# Patient Record
Sex: FEMALE | Race: BLACK OR AFRICAN AMERICAN | Employment: FULL TIME | URBAN - METROPOLITAN AREA
[De-identification: names, ages, dates, MRNs, and addresses within clinical notes are randomized per-mention and may not be internally consistent; named-entity substitution may affect disease eponyms.]

---

## 2019-10-23 ENCOUNTER — HOSPITAL ENCOUNTER (OUTPATIENT)
Dept: MAMMOGRAPHY | Age: 39
Discharge: HOME | End: 2019-10-23
Attending: OBSTETRICS & GYNECOLOGY
Payer: COMMERCIAL

## 2019-10-23 ENCOUNTER — HOSPITAL ENCOUNTER (OUTPATIENT)
Dept: LAB | Age: 39
Discharge: HOME | End: 2019-10-23
Payer: COMMERCIAL

## 2019-10-23 DIAGNOSIS — Z12.31 VISIT FOR SCREENING MAMMOGRAM: ICD-10-CM

## 2019-10-23 DIAGNOSIS — Z12.39 ENCOUNTER FOR OTHER SCREENING FOR MALIGNANT NEOPLASM OF BREAST: ICD-10-CM

## 2019-10-23 PROCEDURE — 88175 CYTOPATH C/V AUTO FLUID REDO: CPT

## 2019-10-23 PROCEDURE — 77063 BREAST TOMOSYNTHESIS BI: CPT

## 2019-10-23 PROCEDURE — 87624 HPV HI-RISK TYP POOLED RSLT: CPT

## 2019-10-28 ENCOUNTER — HOSPITAL ENCOUNTER (OUTPATIENT)
Dept: MAMMOGRAPHY | Age: 39
Discharge: HOME | End: 2019-10-28
Attending: OBSTETRICS & GYNECOLOGY
Payer: COMMERCIAL

## 2019-10-28 ENCOUNTER — HOSPITAL ENCOUNTER (OUTPATIENT)
Dept: ULTRASOUND IMAGING | Age: 39
Discharge: HOME | End: 2019-10-28
Attending: OBSTETRICS & GYNECOLOGY
Payer: COMMERCIAL

## 2019-10-28 DIAGNOSIS — R92.8 ABNORMALITY OF RIGHT BREAST ON SCREENING MAMMOGRAM: ICD-10-CM

## 2019-10-28 DIAGNOSIS — R92.2 INCONCLUSIVE MAMMOGRAM: ICD-10-CM

## 2019-10-28 PROCEDURE — 77061 BREAST TOMOSYNTHESIS UNI: CPT

## 2019-10-28 PROCEDURE — 76642 ULTRASOUND BREAST LIMITED: CPT

## 2020-02-10 ENCOUNTER — HOSPITAL ENCOUNTER (OUTPATIENT)
Dept: LAB | Age: 40
Discharge: HOME | End: 2020-02-10
Payer: COMMERCIAL

## 2020-02-10 PROCEDURE — 88305 TISSUE EXAM BY PATHOLOGIST: CPT

## 2020-11-05 ENCOUNTER — HOSPITAL ENCOUNTER (OUTPATIENT)
Dept: MAMMOGRAPHY | Age: 40
Discharge: HOME | End: 2020-11-05
Attending: OBSTETRICS & GYNECOLOGY
Payer: COMMERCIAL

## 2020-11-05 DIAGNOSIS — Z12.31 VISIT FOR SCREENING MAMMOGRAM: ICD-10-CM

## 2020-11-05 PROCEDURE — 77063 BREAST TOMOSYNTHESIS BI: CPT

## 2021-04-22 ENCOUNTER — HOSPITAL ENCOUNTER (OUTPATIENT)
Dept: LAB | Age: 41
Discharge: HOME | End: 2021-04-22
Payer: COMMERCIAL

## 2021-04-22 PROCEDURE — 88175 CYTOPATH C/V AUTO FLUID REDO: CPT

## 2021-04-26 LAB
CYTOLOGIST CVX/VAG CYTO: NORMAL
CYTOLOGY CVX/VAG DOC THIN PREP: NORMAL
HPV REFLEX NOTE, HPVL19: NORMAL
Lab: NORMAL
PATH REPORT.FINAL DX SPEC: NORMAL
STAT OF ADQ CVX/VAG CYTO-IMP: NORMAL

## 2022-02-01 ENCOUNTER — TRANSCRIBE ORDER (OUTPATIENT)
Dept: SCHEDULING | Age: 42
End: 2022-02-01

## 2022-02-01 DIAGNOSIS — Z12.31 VISIT FOR SCREENING MAMMOGRAM: Primary | ICD-10-CM

## 2022-02-02 ENCOUNTER — HOSPITAL ENCOUNTER (OUTPATIENT)
Dept: WOMENS IMAGING | Age: 42
Discharge: HOME | End: 2022-02-02
Attending: OBSTETRICS & GYNECOLOGY
Payer: COMMERCIAL

## 2022-02-02 DIAGNOSIS — Z12.31 VISIT FOR SCREENING MAMMOGRAM: ICD-10-CM

## 2022-02-02 PROCEDURE — 77063 BREAST TOMOSYNTHESIS BI: CPT

## 2023-06-01 ENCOUNTER — HOSPITAL ENCOUNTER (OUTPATIENT)
Dept: WOMENS IMAGING | Facility: HOSPITAL | Age: 43
Discharge: HOME | End: 2023-06-01
Attending: OBSTETRICS & GYNECOLOGY
Payer: MEDICAID

## 2023-06-01 DIAGNOSIS — Z12.31 VISIT FOR SCREENING MAMMOGRAM: ICD-10-CM

## 2023-06-01 PROCEDURE — 77063 BREAST TOMOSYNTHESIS BI: CPT

## 2023-06-07 ENCOUNTER — HOSPITAL ENCOUNTER (OUTPATIENT)
Facility: HOSPITAL | Age: 43
Discharge: HOME | End: 2023-06-10
Attending: OBSTETRICS & GYNECOLOGY
Payer: MEDICAID

## 2023-06-07 ENCOUNTER — HOSPITAL ENCOUNTER (OUTPATIENT)
Dept: WOMENS IMAGING | Facility: HOSPITAL | Age: 43
Discharge: HOME | End: 2023-06-10
Attending: OBSTETRICS & GYNECOLOGY
Payer: MEDICAID

## 2023-06-07 DIAGNOSIS — R92.8 ABNORMAL FINDING ON BREAST IMAGING: ICD-10-CM

## 2023-06-07 PROCEDURE — G0279 TOMOSYNTHESIS, MAMMO: HCPCS

## 2023-06-07 PROCEDURE — 76642 ULTRASOUND BREAST LIMITED: CPT

## 2023-06-26 ENCOUNTER — HOSPITAL ENCOUNTER (OUTPATIENT)
Facility: HOSPITAL | Age: 43
Setting detail: SPECIMEN
Discharge: HOME | End: 2023-06-29
Payer: MEDICAID

## 2023-06-26 PROCEDURE — 87624 HPV HI-RISK TYP POOLED RSLT: CPT

## 2023-06-26 PROCEDURE — 88175 CYTOPATH C/V AUTO FLUID REDO: CPT

## 2023-10-19 ENCOUNTER — OFFICE VISIT (OUTPATIENT)
Age: 43
End: 2023-10-19
Payer: MEDICAID

## 2023-10-19 VITALS — OXYGEN SATURATION: 98 % | WEIGHT: 250 LBS | HEART RATE: 72 BPM | BODY MASS INDEX: 35 KG/M2 | HEIGHT: 71 IN

## 2023-10-19 DIAGNOSIS — G89.29 CHRONIC BILATERAL LOW BACK PAIN WITH LEFT-SIDED SCIATICA: Primary | ICD-10-CM

## 2023-10-19 DIAGNOSIS — M54.42 CHRONIC BILATERAL LOW BACK PAIN WITH LEFT-SIDED SCIATICA: Primary | ICD-10-CM

## 2023-10-19 PROCEDURE — 99203 OFFICE O/P NEW LOW 30 MIN: CPT | Performed by: PHYSICAL MEDICINE & REHABILITATION

## 2023-10-19 RX ORDER — LISINOPRIL AND HYDROCHLOROTHIAZIDE 12.5; 1 MG/1; MG/1
1 TABLET ORAL DAILY
COMMUNITY
Start: 2023-08-01

## 2023-10-19 RX ORDER — LIDOCAINE HYDROCHLORIDE 20 MG/ML
SOLUTION OROPHARYNGEAL
COMMUNITY
Start: 2023-10-04

## 2023-10-19 RX ORDER — EMPAGLIFLOZIN 25 MG/1
25 TABLET, FILM COATED ORAL DAILY
COMMUNITY
Start: 2023-09-02

## 2023-10-19 RX ORDER — ATORVASTATIN CALCIUM 10 MG/1
10 TABLET, FILM COATED ORAL DAILY
COMMUNITY
Start: 2023-08-01

## 2023-10-19 RX ORDER — FLUTICASONE PROPIONATE 50 MCG
SPRAY, SUSPENSION (ML) NASAL
COMMUNITY
Start: 2023-10-13

## 2023-10-19 RX ORDER — FERROUS SULFATE 325(65) MG
1 TABLET ORAL 2 TIMES DAILY
COMMUNITY
Start: 2023-09-09

## 2023-10-19 RX ORDER — CHOLECALCIFEROL (VITAMIN D3) 1250 MCG
1 CAPSULE ORAL WEEKLY
COMMUNITY
Start: 2023-10-02

## 2023-10-19 ASSESSMENT — PATIENT HEALTH QUESTIONNAIRE - PHQ9
2. FEELING DOWN, DEPRESSED OR HOPELESS: 0
SUM OF ALL RESPONSES TO PHQ9 QUESTIONS 1 & 2: 0
SUM OF ALL RESPONSES TO PHQ QUESTIONS 1-9: 0
1. LITTLE INTEREST OR PLEASURE IN DOING THINGS: 0
SUM OF ALL RESPONSES TO PHQ QUESTIONS 1-9: 0

## 2023-10-19 NOTE — PROGRESS NOTES
Keon Díaz presents today for   Chief Complaint   Patient presents with    Back Pain       Is someone accompanying this pt? no    Is the patient using any DME equipment during OV? no    Depression Screening:       No data to display                Learning Assessment:  No flowsheet data found. Abuse Screening:       No data to display                Fall Risk  No flowsheet data found. OPIOID RISK TOOL  No flowsheet data found. Coordination of Care:  1. Have you been to the ER, urgent care clinic since your last visit? Yes pink eye 10/23  Hospitalized since your last visit? no    2. Have you seen or consulted any other health care providers outside of the [address] [place] [address] since your last visit? no Include any pap smears or colon screening.  no

## 2023-10-19 NOTE — PROGRESS NOTES
[facility]  [address], [address] [place] [address]   Phone: [phone]  Fax: [phone]      Patient: Jennifer Negron                                                                              MRN: [id number]        YOB: 1980          AGE: 37 y.o. PCP: Bisi Madrid DO  Date:  10/19/23    Reason for Consultation: Back Pain      HPI:  Jennifer Negron is a 37 y.o. female with relevant PMH of DM, HTN  who presents with low back pain. The pain began over 10 years ago  Denies any precipitating incident or trauma. Neurologic symptoms: No numbness, tingling, weakness, bowel or bladder changes. No recent falls      Location: The pain is located in the low back pain   Radiation: The pain does radiate down the left leg to posterior thigh. Pain Score: Currently: 8/10    Quality: Pain is of a aching, cramping, stabbing, stiff quality. Aggravating: Pain is exacerbated by walking and sitting  Alleviating: The pain is alleviated by lying down    Prior Treatments:  Physical therapy: None  Injections:Yes ADAN- >10 years ago  Surgery:No  Previous Medications:   Current Medications: ibuprofen 800mg  Previous work-up has included:   Images: The imaging results as well as the actual images of the studies below were reviewed, visualized and interpreted by me. X-ray lumbar spine 7/19/2023  VERTEBRAE AND ALIGNMENT: Stable vertebral body heights. No listhesis. Mild multilevel degenerative facet osteoarthrosis with progressive hypertrophic changes at L4-5 and L5-S1.     DISC SPACES: Multilevel degenerative endplate spurring/spondylosis, intervally progressed at posterior L4-5 and L5-S1, with mild L4-5 and mild-to-moderate L5-S1 disc space narrowing   Past Medical History:   Past Medical History:   Diagnosis Date    DM (diabetes mellitus) ([address])     HTN (hypertension)       Past Surgical History: History reviewed.  No pertinent surgical

## 2023-10-25 ENCOUNTER — HOSPITAL ENCOUNTER (OUTPATIENT)
Facility: HOSPITAL | Age: 43
Setting detail: RECURRING SERIES
Discharge: HOME | End: 2023-10-28
Payer: MEDICAID

## 2023-10-25 PROCEDURE — 97162 PT EVAL MOD COMPLEX 30 MIN: CPT

## 2023-10-25 NOTE — PROGRESS NOTES
PHYSICAL / OCCUPATIONAL THERAPY - DAILY TREATMENT NOTE (updated )    Patient Name: Ernst Yeboah    Date: 10/25/2023    : 1980  Insurance: Payor: Boby Minaya / Plan: Boby Minaya / Product Type: *No Product type* /      Patient  verified Yes     Visit #   Current / Total 1 10   Time   In / Out 1:00 1:38   Pain   In / Out 8 6   Subjective Functional Status/Changes: Pain:    Worst: 10/10  Best: 610  Location: left lumbar and post hip and LE  Aggravated by: car transfers, sleep, prolonged sitting, sit to stand transfer, walking and standing prolonged >10 minutes, stairs  Alleviated by: side lying on left side with knee pillow, supine     TREATMENT AREA =  Other low back pain [M54.59]    OBJECTIVE    Modalities Rationale:     decrease pain to improve patient's ability to progress to PLOF and address remaining functional goals. min [] Estim Unattended, type/location:                                      []  w/ice    []  w/heat    min [] Estim Attended, type/location:                                     []  w/US     []  w/ice    []  w/heat    []  TENS insruct      min []  Mechanical Traction: type/lbs                   []  pro   []  sup   []  int   []  cont    []  before manual    []  after manual    min []  Ultrasound, settings/location:      min []  Iontophoresis w/ dexamethasone, location:                                               []  take home patch       []  in clinic   10 min  unbill []  Ice     [x]  Heat    location/position: Lumbar in prone    min []  Paraffin,  details:     min []  Vasopneumatic Device, press/temp:     min []  Charlann Dues / Exie Rahman:     If using vaso (only need to measure limb vaso being performed on)      pre-treatment girth :       post-treatment girth :       measured at (landmark location) :      min []  Other:    Skin assessment post-treatment (if applicable):    [x]  intact    []  redness- no adverse reaction                 []redness - adverse reaction:
Complexity Rating: MEDIUM  Problem List: pain affecting function, decrease ROM, decrease strength, impaired gait/balance, decrease ADL/functional abilities, decrease activity tolerance, and decrease flexibility/joint mobility   Treatment Plan may include any combination of the followin Therapeutic Exercise, 63935 Neuromuscular Re-Education, 46850 Manual Therapy, 66410 Therapeutic Activity, 56364 Self Care/Home Management, 82187 Electrical Stim unattended, and 63651 Vasopneumatic Device  (Vasopnuematic compression justification:  Per bilateral girth measures taken and listed above the edema is considered significant and having an impact on the patient's self care and ADL's) If patient is receiving VASO: Vasopnuematic compression justification:  Per bilateral girth measures taken and listed above the edema is considered significant and having an impact on the patient's strength, balance, gait, transfers, self care, and ADL's  Patient / Family readiness to learn indicated by: asking questions, trying to perform skills, interest, return verbalization , and return demonstration   Persons(s) to be included in education: patient (P)  Barriers to Learning/Limitations: none  Measures taken if barriers to learning present: NA  Patient Goal (s): \"Relief\"  Patient Self Reported Health Status: good  Rehabilitation Potential: good    Short Term Goals: To be accomplished in 4 weeks  Patient will report daily compliance with HEP to improve tolerance to ADLs. Status at last assessment: provided HEP at   Long Term Goals: To be accomplished in 10 treatments  Patient will report pain 2/10 at worst to improve tolerance to sitting, standing and walking. Status at last assessment: pain 10/10 at worst  Patient will increase TUG Score to 10 seconds to improve tolerance to ADLs. Status at last assessment: 15 seconds  Patient will increase 5 X STS to 20 seconds to improve sit to stand transfers.   Status at last assessment: 30

## 2023-10-27 ENCOUNTER — APPOINTMENT (OUTPATIENT)
Facility: HOSPITAL | Age: 43
End: 2023-10-27
Payer: MEDICAID

## 2023-11-01 ENCOUNTER — HOSPITAL ENCOUNTER (OUTPATIENT)
Facility: HOSPITAL | Age: 43
Setting detail: RECURRING SERIES
Discharge: HOME | End: 2023-11-04
Payer: MEDICAID

## 2023-11-01 PROCEDURE — 97112 NEUROMUSCULAR REEDUCATION: CPT

## 2023-11-01 PROCEDURE — 97530 THERAPEUTIC ACTIVITIES: CPT

## 2023-11-01 PROCEDURE — 97110 THERAPEUTIC EXERCISES: CPT

## 2023-11-01 PROCEDURE — G0283 ELEC STIM OTHER THAN WOUND: HCPCS

## 2023-11-01 NOTE — PROGRESS NOTES
PHYSICAL / OCCUPATIONAL THERAPY - DAILY TREATMENT NOTE (updated )    Patient Name: Mary Jya    Date: 2023    : 1980  Insurance: Payor: Everton Godoy / Plan: Everton Godoy / Product Type: *No Product type* /      Patient  verified Yes     Visit #   Current / Total 2 10   Time   In / Out 11:40 12:28   Pain   In / Out 6 4   Subjective Functional Status/Changes: \"My back and leg still feel the same. \"     TREATMENT AREA =  Other low back pain [M54.59]    OBJECTIVE    Modalities Rationale:     decrease pain to improve patient's ability to progress to PLOF and address remaining functional goals. 10 min [x] Estim Unattended, type/location: Lumbar IFC in prone with one pillow                                     []  w/ice    [x]  w/heat    min [] Estim Attended, type/location:                                     []  w/US     []  w/ice    []  w/heat    []  TENS insruct      min []  Mechanical Traction: type/lbs                   []  pro   []  sup   []  int   []  cont    []  before manual    []  after manual    min []  Ultrasound, settings/location:      min []  Iontophoresis w/ dexamethasone, location:                                               []  take home patch       []  in clinic   X min  unbill []  Ice     [x]  Heat    location/position: Lumbar in prone    min []  Paraffin,  details:     min []  Vasopneumatic Device, press/temp:     min []  Alpae Norma / Sana Pardon: If using vaso (only need to measure limb vaso being performed on)      pre-treatment girth :       post-treatment girth :       measured at (landmark location) :      min []  Other:    Skin assessment post-treatment (if applicable):    [x]  intact    []  redness- no adverse reaction                 []redness - adverse reaction:        Therapeutic Procedures:   Tx Min  Procedure, Rationale, Specifics   20  35144 Therapeutic Exercise (timed):  increase ROM, strength, coordination, balance, and proprioception to improve patient's

## 2023-11-03 ENCOUNTER — HOSPITAL ENCOUNTER (OUTPATIENT)
Facility: HOSPITAL | Age: 43
Setting detail: RECURRING SERIES
Discharge: HOME | End: 2023-11-06
Payer: MEDICAID

## 2023-11-03 PROCEDURE — 97535 SELF CARE MNGMENT TRAINING: CPT

## 2023-11-03 PROCEDURE — 97110 THERAPEUTIC EXERCISES: CPT

## 2023-11-03 PROCEDURE — G0283 ELEC STIM OTHER THAN WOUND: HCPCS

## 2023-11-03 PROCEDURE — 97112 NEUROMUSCULAR REEDUCATION: CPT

## 2023-11-03 NOTE — PROGRESS NOTES
PHYSICAL / OCCUPATIONAL THERAPY - DAILY TREATMENT NOTE (updated )    Patient Name: Flaco Cooper    Date: 11/3/2023    : 1980  Insurance: Payor: Kevin Hardin / Plan: Tomas Queen / Product Type: *No Product type* /      Patient  verified Yes     Visit #   Current / Total 3 10   Time   In / Out 1:00 pm 1:53 pm   Pain   In / Out 4-5/10 010   Subjective Functional Status/Changes: \"I still have the pain. I do the exercises every now and then. \"     TREATMENT AREA =  Other low back pain [M54.59]    OBJECTIVE    Modalities Rationale:     decrease pain to improve patient's ability to progress to PLOF and address remaining functional goals. 10 min [x] Estim Unattended, type/location: Lumbar IFC in prone with two pillows under stomach to promote neutral spinal alignment                                     []  w/ice    [x]  w/heat   Skin assessment post-treatment (if applicable):    [x]  intact    []  redness- no adverse reaction                 []redness - adverse reaction:        Therapeutic Procedures: Tx Min  Procedure, Rationale, Specifics   15  61315 Therapeutic Exercise (timed):  increase ROM, strength, coordination, balance, and proprioception to improve patient's ability to progress to PLOF and address remaining functional goals. (see flow sheet as applicable)      Details if applicable:  lumbar extension protocol     18  00735 Neuromuscular Re-Education (timed):  improve balance, coordination, kinesthetic sense, posture, core stability and proprioception to improve patient's ability to develop conscious control of individual muscles and awareness of position of extremities in order to progress to PLOF and address remaining functional goals.  (see flow sheet as applicable)       Details if applicable:  core and glute re-ed, primarily in prone position     10  28932 Self Care/Home Management (timed):  improve patient knowledge and understanding of pain reducing techniques,

## 2023-11-06 ENCOUNTER — APPOINTMENT (OUTPATIENT)
Facility: HOSPITAL | Age: 43
End: 2023-11-06
Payer: MEDICAID

## 2023-11-10 ENCOUNTER — HOSPITAL ENCOUNTER (OUTPATIENT)
Facility: HOSPITAL | Age: 43
Setting detail: RECURRING SERIES
Discharge: HOME | End: 2023-11-13
Payer: MEDICAID

## 2023-11-10 PROCEDURE — G0283 ELEC STIM OTHER THAN WOUND: HCPCS

## 2023-11-10 PROCEDURE — 97112 NEUROMUSCULAR REEDUCATION: CPT

## 2023-11-10 PROCEDURE — 97535 SELF CARE MNGMENT TRAINING: CPT

## 2023-11-10 PROCEDURE — 97110 THERAPEUTIC EXERCISES: CPT

## 2023-11-10 NOTE — PROGRESS NOTES
PHYSICAL / OCCUPATIONAL THERAPY - DAILY TREATMENT NOTE (updated )    Patient Name: Kyleigh Guzman    Date: 11/10/2023    : 1980  Insurance: Payor: Magdaleno Colorado / Plan: Magdaleno Colorado / Product Type: *No Product type* /      Patient  verified Yes     Visit #   Current / Total 4 10   Time   In / Out 10:57 am 11:38 am   Pain   In / Out 7/10 in left flank and hip 4/10   Subjective Functional Status/Changes: \"It's been worse the past couple days. \"     TREATMENT AREA =  Other low back pain [M54.59]    OBJECTIVE    Modalities Rationale:     decrease pain to improve patient's ability to progress to PLOF and address remaining functional goals. 10 min [x] Estim Unattended, type/location: Lumbar IFC in prone with two pillows under stomach to promote neutral spinal alignment                                     []  w/ice    [x]  w/heat   Skin assessment post-treatment (if applicable):    [x]  intact    []  redness- no adverse reaction                 []redness - adverse reaction:        Therapeutic Procedures: Tx Min  Procedure, Rationale, Specifics   13  08504 Therapeutic Exercise (timed):  increase ROM, strength, coordination, balance, and proprioception to improve patient's ability to progress to PLOF and address remaining functional goals. (see flow sheet as applicable)      Details if applicable:  lumbar extension protocol     8  45055 Neuromuscular Re-Education (timed):  improve balance, coordination, kinesthetic sense, posture, core stability and proprioception to improve patient's ability to develop conscious control of individual muscles and awareness of position of extremities in order to progress to PLOF and address remaining functional goals.  (see flow sheet as applicable)       Details if applicable:  core and glute re-ed, primarily in prone position     10  26035 Self Care/Home Management (timed):  improve patient knowledge and understanding of pain reducing techniques, positioning,

## 2023-11-13 ENCOUNTER — HOSPITAL ENCOUNTER (OUTPATIENT)
Facility: HOSPITAL | Age: 43
Setting detail: RECURRING SERIES
Discharge: HOME | End: 2023-11-16
Payer: MEDICAID

## 2023-11-13 PROCEDURE — 97110 THERAPEUTIC EXERCISES: CPT

## 2023-11-13 PROCEDURE — 97112 NEUROMUSCULAR REEDUCATION: CPT

## 2023-11-13 PROCEDURE — G0283 ELEC STIM OTHER THAN WOUND: HCPCS

## 2023-11-13 PROCEDURE — 97535 SELF CARE MNGMENT TRAINING: CPT

## 2023-11-13 NOTE — PROGRESS NOTES
PHYSICAL / OCCUPATIONAL THERAPY - DAILY TREATMENT NOTE (updated )    Patient Name: Julissa Nguyen    Date: 2023    : 1980  Insurance: Payor: Stefani Pierce / Plan: Stefani Pierce / Product Type: *No Product type* /      Patient  verified Yes     Visit #   Current / Total 5 10   Time   In / Out 11:40 12:32   Pain   In / Out 5/10 2/10   Subjective Functional Status/Changes: \"I'm not feeling any better. \"     TREATMENT AREA =  Other low back pain [M54.59]    OBJECTIVE    Modalities Rationale:     decrease pain to improve patient's ability to progress to PLOF and address remaining functional goals. 10 min [x] Estim Unattended, type/location: Lumbar IFC in supine with knee wedge                                    []  w/ice    [x]  w/heat   Skin assessment post-treatment (if applicable):    [x]  intact    []  redness- no adverse reaction                 []redness - adverse reaction:        Therapeutic Procedures: Tx Min  Procedure, Rationale, Specifics   16  81940 Therapeutic Exercise (timed):  increase ROM, strength, coordination, balance, and proprioception to improve patient's ability to progress to PLOF and address remaining functional goals. (see flow sheet as applicable)      Details if applicable:  neutral spine strengthening and core stability, hip mobility     16  06376 Neuromuscular Re-Education (timed):  improve balance, coordination, kinesthetic sense, posture, core stability and proprioception to improve patient's ability to develop conscious control of individual muscles and awareness of position of extremities in order to progress to PLOF and address remaining functional goals.  (see flow sheet as applicable)       Details if applicable:  core and glute re-ed; prone DTM left post hip and sacral extension mobs to improve glute firing     10  37028 Self Care/Home Management (timed):  improve patient knowledge and understanding of pain reducing techniques, positioning,

## 2023-11-15 ENCOUNTER — APPOINTMENT (OUTPATIENT)
Facility: HOSPITAL | Age: 43
End: 2023-11-15
Payer: MEDICAID

## 2023-11-20 ENCOUNTER — HOSPITAL ENCOUNTER (OUTPATIENT)
Facility: HOSPITAL | Age: 43
Setting detail: RECURRING SERIES
Discharge: HOME | End: 2023-11-23
Payer: MEDICAID

## 2023-11-20 PROCEDURE — 97112 NEUROMUSCULAR REEDUCATION: CPT

## 2023-11-20 PROCEDURE — 97535 SELF CARE MNGMENT TRAINING: CPT

## 2023-11-20 PROCEDURE — 97110 THERAPEUTIC EXERCISES: CPT

## 2023-11-20 NOTE — PROGRESS NOTES
PHYSICAL / OCCUPATIONAL THERAPY - DAILY TREATMENT NOTE (updated )    Patient Name: Aby Riddle    Date: 2023    : 1980  Insurance: Payor: Janice Herrmann / Plan: Janice Herrmann / Product Type: *No Product type* /      Patient  verified Yes     Visit #   Current / Total 6 10   Time   In / Out 11:05 am 11:45 am   Pain   In / Out 5/10 5/10   Subjective Functional Status/Changes: \"I feel terrible. I just want to get a scan on it to see, so they know what it going on with it. \"     TREATMENT AREA =  Other low back pain [M54.59]    OBJECTIVE  Modalities Rationale:     decrease pain to improve patient's ability to progress to PLOF and address remaining functional goals. min [] Estim Unattended, type/location:                                      []  w/ice    []  w/heat    min [] Estim Attended, type/location:                                     []  w/US     []  w/ice    []  w/heat    []  TENS insruct      min []  Mechanical Traction: type/lbs                   []  pro   []  sup   []  int   []  cont    []  before manual    []  after manual    min []  Ultrasound, settings/location:     10 min  unbill [x]  Ice     []  Heat    location/position: L/S and (L) SIJ, prone    min []  Paraffin,  details:     min []  Vasopneumatic Device, press/temp:     min []  Jandayrona Antwon / Petoskey Lasso: If using vaso (only need to measure limb vaso being performed on)      pre-treatment girth :       post-treatment girth :       measured at (landmark location) :      min []  Other:    Skin assessment post-treatment:   Intact       Therapeutic Procedures: Tx Min  Procedure, Rationale, Specifics   10  90104 Therapeutic Exercise (timed):  increase ROM, strength, coordination, balance, and proprioception to improve patient's ability to progress to PLOF and address remaining functional goals.  (see flow sheet as applicable)      Details if applicable:  neutral spine strengthening and core stability, hip mobility     12  24497

## 2023-11-22 ENCOUNTER — APPOINTMENT (OUTPATIENT)
Facility: HOSPITAL | Age: 43
End: 2023-11-22
Payer: MEDICAID

## 2023-11-27 ENCOUNTER — APPOINTMENT (OUTPATIENT)
Facility: HOSPITAL | Age: 43
End: 2023-11-27
Payer: MEDICAID

## 2023-11-28 ENCOUNTER — HOSPITAL ENCOUNTER (OUTPATIENT)
Facility: HOSPITAL | Age: 43
Setting detail: RECURRING SERIES
Discharge: HOME | End: 2023-12-01
Payer: MEDICAID

## 2023-11-28 PROCEDURE — 97110 THERAPEUTIC EXERCISES: CPT

## 2023-11-28 PROCEDURE — 97535 SELF CARE MNGMENT TRAINING: CPT

## 2023-11-28 PROCEDURE — 97112 NEUROMUSCULAR REEDUCATION: CPT

## 2023-11-28 NOTE — PROGRESS NOTES
[address] [facility] [facility]   [address], [address]  Phone: [phone] Fax: [phone]  PROGRESS NOTE  Patient Name: Mando Moran : 1980   Treatment/Medical Diagnosis: Other low back pain [M54.59]   Referral Source:  Payor Ashu Lomeli*  Payor: RAHAT Arguellory / Plan: Brian Elliot / Product Type: *No Product type* /      Date of Initial Visit: 10/25/2023 Attended Visits: 7 Missed Visits: 1     SUMMARY OF TREATMENT  Mando Moran is a 37 y.o.  yo female with Dx of Other low back pain [M54.59]. Chronic LBP > 5 years with recent exacerbation of symptoms. Treatment has consisted of TE, TA, NMRE, gait training for normalization of gait, modalities including heat and IFC for pain management; postural education, HEP, pt education     CURRENT STATUS  Pt has made poor progress in therapy, con't to rate high levels of pain and nothing that significantly improves her pain. She does have objective improvements in function. Pt did benefit from extension progression and now possibly has a lateral component with relief in L SL rotation position. However, pain is largely unchanged after therapy session ends. She has new onset of L anterior thigh and knee pain, possibly orthopaedic related to chronic antalgic gait; will con't to monitor. Recommend pt con't with PT until her follow-up with MD on 23     Pain ranges: 2 to 10/10   Pain constantly at the L posterior and anterior hip; more often in the anterior knee and thigh  Deficits : pain prevents her from falling asleep; some seizing pain; sitting sustained; long car ride for Thanksgiving.    improvements: none noted; short lived improvements from PT session   0% improvement  Pain increase: \"unsure\"; sitting in the car  Pain made better: ibuprofen, ice  Future care: scheduled for MRI end of December     Lumbar AROM:  Flexion To tib tub; anterior L groin pain (not normal for pt)

## 2023-11-28 NOTE — PROGRESS NOTES
PHYSICAL / OCCUPATIONAL THERAPY - DAILY TREATMENT NOTE (updated )    Patient Name: Ceci Riding    Date: 2023    : 1980  Insurance: Payor: Shashank Navarro / Plan: Shashank Navarro / Product Type: *No Product type* /      Patient  verified Yes     Visit #   Current / Total 7 10   Time   In / Out 9:46 10:35   Pain   In / Out 4-5 0-1   Subjective Functional Status/Changes: \"I spent all day in bed and almost cancelled\"  Pain location: L hip, knee, full lumbar spine     TREATMENT AREA =  Other low back pain [M54.59]    OBJECTIVE  Modalities Rationale:     decrease pain to improve patient's ability to progress to PLOF and address remaining functional goals. min [] Estim Unattended, type/location:                                      []  w/ice    []  w/heat    min [] Estim Attended, type/location:                                     []  w/US     []  w/ice    []  w/heat    []  TENS insruct      min []  Mechanical Traction: type/lbs                   []  pro   []  sup   []  int   []  cont    []  before manual    []  after manual    min []  Ultrasound, settings/location:     10 min  unbill [x]  Ice     []  Heat    location/position: L/S and (L) SIJ, prone    min []  Paraffin,  details:     min []  Vasopneumatic Device, press/temp:     min []  Mechele Fuentes / Thelda Blowers: If using vaso (only need to measure limb vaso being performed on)      pre-treatment girth :       post-treatment girth :       measured at (landmark location) :      min []  Other:    Skin assessment post-treatment:   Intact       Therapeutic Procedures: Tx Min  Procedure, Rationale, Specifics   21  [id number] Therapeutic Exercise (timed):  increase ROM, strength, coordination, balance, and proprioception to improve patient's ability to progress to PLOF and address remaining functional goals.  (see flow sheet as applicable)      Details if applicable:  Reassessment for PN    Prone lying: no effect  Prone lying with L hip ER (L shift):

## 2023-11-29 ENCOUNTER — APPOINTMENT (OUTPATIENT)
Facility: HOSPITAL | Age: 43
End: 2023-11-29
Payer: MEDICAID

## 2023-12-11 ENCOUNTER — HOSPITAL ENCOUNTER (OUTPATIENT)
Facility: HOSPITAL | Age: 43
Setting detail: RECURRING SERIES
Discharge: HOME | End: 2023-12-14
Payer: MEDICAID

## 2023-12-11 ENCOUNTER — HOSPITAL ENCOUNTER (OUTPATIENT)
Facility: HOSPITAL | Age: 43
Setting detail: RECURRING SERIES
End: 2023-12-11
Payer: MEDICAID

## 2023-12-11 PROCEDURE — 97110 THERAPEUTIC EXERCISES: CPT

## 2023-12-11 PROCEDURE — 97112 NEUROMUSCULAR REEDUCATION: CPT

## 2023-12-11 PROCEDURE — 97530 THERAPEUTIC ACTIVITIES: CPT

## 2023-12-11 NOTE — PROGRESS NOTES
non-antalgic gait with TUG 10\", 9\" and 9\"   - Patient will increase 5 X STS to 20 seconds to improve sit to stand transfers. Status at last assessment:  22\" no pain with movement   - Patient will increase FOTO Score to 60 points to improve tolerance to ADLs.   Status at last assessment:  51/100    PLAN  [x] Continue plan of care  [x]  Upgrade activities as tolerated  []  Discharge due to :  []  Other:    Claudia Watters PT    12/11/2023    11:36 AM    Future Appointments   Date Time Provider [address]   12/11/2023  3:00 PM Claudia Watters PT [facility]   12/14/2023  9:45 AM Ileana Aly MD [facility]   12/19/2023 10:20 [facility]   12/27/2023 12:20 PM [facility]

## 2023-12-14 ENCOUNTER — OFFICE VISIT (OUTPATIENT)
Age: 43
End: 2023-12-14
Payer: MEDICAID

## 2023-12-14 VITALS — WEIGHT: 243.6 LBS | BODY MASS INDEX: 34.1 KG/M2 | HEIGHT: 71 IN

## 2023-12-14 DIAGNOSIS — M54.42 CHRONIC BILATERAL LOW BACK PAIN WITH LEFT-SIDED SCIATICA: Primary | ICD-10-CM

## 2023-12-14 DIAGNOSIS — G89.29 CHRONIC BILATERAL LOW BACK PAIN WITH LEFT-SIDED SCIATICA: Primary | ICD-10-CM

## 2023-12-14 DIAGNOSIS — M25.562 ACUTE PAIN OF LEFT KNEE: ICD-10-CM

## 2023-12-14 PROCEDURE — 99213 OFFICE O/P EST LOW 20 MIN: CPT | Performed by: PHYSICAL MEDICINE & REHABILITATION

## 2023-12-14 RX ORDER — DULAGLUTIDE 0.75 MG/.5ML
INJECTION, SOLUTION SUBCUTANEOUS
COMMUNITY
Start: 2023-12-07

## 2023-12-14 RX ORDER — MEDROXYPROGESTERONE ACETATE 150 MG/ML
INJECTION, SUSPENSION INTRAMUSCULAR
COMMUNITY
Start: 2020-07-21

## 2023-12-14 RX ORDER — INSULIN GLARGINE AND LIXISENATIDE 100; 33 U/ML; UG/ML
INJECTION, SOLUTION SUBCUTANEOUS
COMMUNITY

## 2023-12-14 RX ORDER — TRIAMCINOLONE ACETONIDE 1 MG/G
CREAM TOPICAL
COMMUNITY

## 2023-12-14 RX ORDER — FLURBIPROFEN SODIUM 0.3 MG/ML
SOLUTION/ DROPS OPHTHALMIC
COMMUNITY
Start: 2023-11-08

## 2023-12-14 RX ORDER — AMLODIPINE BESYLATE 2.5 MG/1
TABLET ORAL
COMMUNITY

## 2023-12-14 RX ORDER — INSULIN GLARGINE-YFGN 100 [IU]/ML
INJECTION, SOLUTION SUBCUTANEOUS
COMMUNITY
Start: 2023-11-07

## 2023-12-14 ASSESSMENT — PATIENT HEALTH QUESTIONNAIRE - PHQ9
2. FEELING DOWN, DEPRESSED OR HOPELESS: 0
SUM OF ALL RESPONSES TO PHQ QUESTIONS 1-9: 0
1. LITTLE INTEREST OR PLEASURE IN DOING THINGS: 0
SUM OF ALL RESPONSES TO PHQ QUESTIONS 1-9: 0
SUM OF ALL RESPONSES TO PHQ9 QUESTIONS 1 & 2: 0

## 2023-12-14 NOTE — PROGRESS NOTES
[place] [id number] [place] [address] S, [address] [place] [address]   Phone: [phone]  Fax: [phone]      Patient: Aby Riddle                                                                              MRN: [id number]        YOB: 1980          AGE: 37 y.o. PCP: Tavo Self DO  Date:  12/14/23    Reason for Consultation: Back Pain      HPI:  Aby Riddle is a 37 y.o. female with relevant PMH of DM, HTN  who presents with low back pain. The pain began over 10 years ago  Denies any precipitating incident or trauma. Neurologic symptoms: No numbness, tingling, weakness, bowel or bladder changes. No recent falls      Location: The pain is located in the low back pain   Radiation: The pain does radiate down the left leg to posterior thigh. Pain Score: Currently: 8/10    Quality: Pain is of a aching, cramping, stabbing, stiff quality. Aggravating: Pain is exacerbated by walking and sitting  Alleviating: The pain is alleviated by lying down    Prior Treatments:  Physical therapy:  Physical therapy 10/25/2023- 12/11/2023 2 x per week   Injections:Yes ADAN- >10 years ago  Surgery:No  Previous Medications:   Current Medications: ibuprofen 800mg  Previous work-up has included:   Images: The imaging results as well as the actual images of the studies below were reviewed, visualized and interpreted by me. X-ray lumbar spine 7/19/2023  VERTEBRAE AND ALIGNMENT: Stable vertebral body heights. No listhesis.  Mild multilevel degenerative facet osteoarthrosis with progressive hypertrophic changes at L4-5 and L5-S1.     DISC SPACES: Multilevel degenerative endplate spurring/spondylosis, intervally progressed at posterior L4-5 and L5-S1, with mild L4-5 and mild-to-moderate L5-S1 disc space narrowing   Past Medical History:   Past Medical History:   Diagnosis Date    DM (diabetes mellitus) ([address])     HTN (hypertension)       Past Surgical

## 2023-12-14 NOTE — PATIENT INSTRUCTIONS
[address] [facility] Radiology    Please expect an automated call within 24-48 business hours to schedule your outpatient study with [facility]    If you have not received an automated call, please call [phone] to speak directly with a     [address] [place] [address] at [facility]

## 2023-12-14 NOTE — PROGRESS NOTES
Huey Goldberg presents today for   Chief Complaint   Patient presents with    Back Pain       Is someone accompanying this pt? Yes-daughter    Is the patient using any DME equipment during OV? no    Depression Screening:       No data to display                Learning Assessment:      Abuse Screening:       No data to display                Fall Risk      OPIOID RISK TOOL      Coordination of Care:  1. Have you been to the ER, urgent care clinic since your last visit? Doesn't remebr  Hospitalized since your last visit? no    2. Have you seen or consulted any other health care providers outside of the [address] [place] [address] since your last visit? no Include any pap smears or colon screening.  no

## 2023-12-27 ENCOUNTER — APPOINTMENT (OUTPATIENT)
Facility: HOSPITAL | Age: 43
End: 2023-12-27
Payer: MEDICAID

## 2024-01-08 ENCOUNTER — TELEPHONE (OUTPATIENT)
Age: 44
End: 2024-01-08

## 2024-01-08 NOTE — TELEPHONE ENCOUNTER
Patient called and said that she was scheduled to have the mri of her Lumbar spine on 1/18/24 at [facility] , but that the mri was cancelled , due to she was told that either a Prior Auth or Peer to Peer is needed, in order for the mri to be approved.    Patient is asking if their is anything  can do to get her mri approved.     Patient tel. [phone].

## 2024-01-08 NOTE — TELEPHONE ENCOUNTER
Patient's MRI is still pending review by Physician. We do have option of to do a Pro-Active [id number] due to patient being in severe pain.     Trackin  P2P: [phone]      If approval received please call Pretty in BS Auth Dept @ [phone].

## 2024-01-16 NOTE — TELEPHONE ENCOUNTER
Patient called back and stated that she hasn't heard from anyone in 2 weeks about the pending MRI.    Please contact the patient back at [phone] with an update at the earliest convenience.

## 2024-01-23 ENCOUNTER — HOSPITAL ENCOUNTER (OUTPATIENT)
Facility: HOSPITAL | Age: 44
Discharge: HOME | End: 2024-01-26
Attending: PHYSICAL MEDICINE & REHABILITATION
Payer: MEDICAID

## 2024-01-23 DIAGNOSIS — G89.29 CHRONIC BILATERAL LOW BACK PAIN WITH LEFT-SIDED SCIATICA: ICD-10-CM

## 2024-01-23 DIAGNOSIS — M54.42 CHRONIC BILATERAL LOW BACK PAIN WITH LEFT-SIDED SCIATICA: ICD-10-CM

## 2024-01-23 PROCEDURE — 72148 MRI LUMBAR SPINE W/O DYE: CPT

## 2024-01-25 ENCOUNTER — OFFICE VISIT (OUTPATIENT)
Age: 44
End: 2024-01-25
Payer: MEDICAID

## 2024-01-25 VITALS — WEIGHT: 237 LBS | BODY MASS INDEX: 33.18 KG/M2 | HEIGHT: 71 IN

## 2024-01-25 DIAGNOSIS — M51.36 BULGING LUMBAR DISC: ICD-10-CM

## 2024-01-25 DIAGNOSIS — G89.29 CHRONIC BILATERAL LOW BACK PAIN WITH LEFT-SIDED SCIATICA: Primary | ICD-10-CM

## 2024-01-25 DIAGNOSIS — M54.42 CHRONIC BILATERAL LOW BACK PAIN WITH LEFT-SIDED SCIATICA: Primary | ICD-10-CM

## 2024-01-25 DIAGNOSIS — M75.41 SHOULDER IMPINGEMENT SYNDROME, RIGHT: ICD-10-CM

## 2024-01-25 PROCEDURE — 99214 OFFICE O/P EST MOD 30 MIN: CPT | Performed by: PHYSICAL MEDICINE & REHABILITATION

## 2024-01-25 RX ORDER — CLINDAMYCIN HYDROCHLORIDE 300 MG/1
300 CAPSULE ORAL 2 TIMES DAILY
COMMUNITY
Start: 2024-01-22

## 2024-01-25 RX ORDER — CLINDAMYCIN PHOSPHATE AND BENZOYL PEROXIDE 10; 50 MG/G; MG/G
GEL TOPICAL
COMMUNITY
Start: 2024-01-03

## 2024-01-25 RX ORDER — GLIPIZIDE 5 MG/1
TABLET, FILM COATED, EXTENDED RELEASE ORAL
COMMUNITY
Start: 2023-08-30

## 2024-01-25 RX ORDER — NYSTATIN AND TRIAMCINOLONE ACETONIDE 100000; 1 [USP'U]/G; MG/G
OINTMENT TOPICAL
COMMUNITY
Start: 2024-01-22

## 2024-01-25 RX ORDER — DESOGESTREL/ETHINYL ESTRADIOL AND ETHINYL ESTRADIOL 21-5 (28)
1 KIT ORAL DAILY
COMMUNITY
Start: 2024-01-22

## 2024-01-25 ASSESSMENT — PATIENT HEALTH QUESTIONNAIRE - PHQ9
SUM OF ALL RESPONSES TO PHQ QUESTIONS 1-9: 0
1. LITTLE INTEREST OR PLEASURE IN DOING THINGS: 0
2. FEELING DOWN, DEPRESSED OR HOPELESS: 0
SUM OF ALL RESPONSES TO PHQ QUESTIONS 1-9: 0
SUM OF ALL RESPONSES TO PHQ9 QUESTIONS 1 & 2: 0

## 2024-01-25 NOTE — PROGRESS NOTES
[facility]  [address] [place] [address], [address]  [place] [address]  Phone: [phone]  Fax: [phone]      Patient: Danielle Barraza                                                                              MRN: [id number]        YOB: 1980          AGE: 43 y.o.             PCP: Nerissa Mason DO  Date:  01/25/24    Reason for Consultation: Shoulder Pain (right)      HPI:  Danielle Barraza is a 43 y.o. female with relevant PMH of DM, HTN  who presents with low back pain. The pain began over 10 years ago  Denies any precipitating incident or trauma.   She had an MRI of her lumbar spine 1/23/234 which demonstrates L4-5 disc bulge with bilateral L5 lateral recess narrowing, L5-S1 disc bulge with mass effect on right S1 nerve root.    Today she also reports over 6 months of right shoulder pain. Pain occurs mainly with overhead activity, lifting and reaching.      Neurologic symptoms: No numbness, tingling, weakness, bowel or bladder changes.  No recent falls      Location: The pain is located in the low back pain 2. Right shoulder    Radiation: The pain does radiate down the left leg to posterior thigh.    Pain Score: Currently: 8/10    Quality: Pain is of a aching, cramping, stabbing, stiff quality.    Aggravating: Pain is exacerbated by walking and sitting  Alleviating: The pain is alleviated by lying down    Prior Treatments:  Physical therapy:  Physical therapy 10/25/2023- 12/11/2023 2 x per week   Injections:Yes DAAN- >10 years ago  Surgery:No  Previous Medications:   Current Medications: ibuprofen 800mg  Previous work-up has included:   Images: The imaging results as well as the actual images of the studies below were reviewed, visualized and interpreted by me.    X-ray lumbar spine 7/19/2023  VERTEBRAE AND ALIGNMENT: Stable vertebral body heights. No listhesis. Mild multilevel degenerative facet osteoarthrosis with progressive hypertrophic changes at L4-5

## 2024-01-25 NOTE — PROGRESS NOTES
Danielle JIMÉNEZ Christi presents today for   Chief Complaint   Patient presents with    Shoulder Pain     right       Is someone accompanying this pt? yes    Is the patient using any DME equipment during OV? no    Depression Screening:       No data to display                Learning Assessment:      Abuse Screening:       No data to display                Fall Risk      OPIOID RISK TOOL      Coordination of Care:  1. Have you been to the ER, urgent care clinic since your last visit? no  Hospitalized since your last visit? no    2. Have you seen or consulted any other health care providers outside of the [facility] since your last visit? no Include any pap smears or colon screening. no

## 2024-02-14 ENCOUNTER — TELEPHONE (OUTPATIENT)
Age: 44
End: 2024-02-14

## 2024-02-14 DIAGNOSIS — G89.29 CHRONIC BILATERAL LOW BACK PAIN WITH LEFT-SIDED SCIATICA: Primary | ICD-10-CM

## 2024-02-14 DIAGNOSIS — M54.42 CHRONIC BILATERAL LOW BACK PAIN WITH LEFT-SIDED SCIATICA: Primary | ICD-10-CM

## 2024-02-14 RX ORDER — METHOCARBAMOL 500 MG/1
500 TABLET, FILM COATED ORAL 2 TIMES DAILY PRN
Qty: 30 TABLET | Refills: 0 | Status: SHIPPED | OUTPATIENT
Start: 2024-02-14

## 2024-02-14 RX ORDER — IBUPROFEN 800 MG/1
800 TABLET ORAL 2 TIMES DAILY PRN
Qty: 30 TABLET | Refills: 0 | Status: SHIPPED | OUTPATIENT
Start: 2024-02-14

## 2024-02-14 NOTE — TELEPHONE ENCOUNTER
Patient called to ask if she could get something for the pain in her lower back, she rates it a 10/10 and is asking if she could have Ibuprofen or a muscle relaxer or something to help with her pain. She states she has taken Flexeril previously and did not want to take that again. Please review and send to [facility] on [address] if possible.    Patient Tel. [phone].

## 2024-02-14 NOTE — TELEPHONE ENCOUNTER
I can refill the ibuprofen and we can try robaxin -muscle relaxant.  If that does not work we can try a nerve medication like gabapentin

## 2024-02-15 NOTE — TELEPHONE ENCOUNTER
I called and spoke to the pt. The pt was identified using 2 pt identifiers. She was notified of Dr. Zelaya's response to her recent message. The pt verbalized understanding and is willing to try anything. She has a different pharmacy than what the medications were sent to. The prescriptions were sent to the [facility] on [address]. She states that she will get them there this time. I updated the pharmacy in the pt's chart to be the [facility] on [address] in [place]. The pt is also asking if she can also try the gabapentin. The message will be sent back to Dr. Zelaya for review.

## 2024-02-16 RX ORDER — GABAPENTIN 100 MG/1
100 CAPSULE ORAL NIGHTLY
Qty: 30 CAPSULE | Refills: 0 | Status: SHIPPED | OUTPATIENT
Start: 2024-02-16 | End: 2024-03-17

## 2024-02-16 NOTE — TELEPHONE ENCOUNTER
I called and notified the pt of the new medication that was sent to the pharmacy. She verbalized understanding and will try the medication. She will call the office if there are any issues.

## 2024-04-11 ENCOUNTER — OFFICE VISIT (OUTPATIENT)
Age: 44
End: 2024-04-11
Payer: MEDICAID

## 2024-04-11 VITALS — HEIGHT: 71 IN | WEIGHT: 242 LBS | BODY MASS INDEX: 33.88 KG/M2

## 2024-04-11 DIAGNOSIS — G89.29 CHRONIC BILATERAL LOW BACK PAIN WITH BILATERAL SCIATICA: Primary | ICD-10-CM

## 2024-04-11 DIAGNOSIS — M54.42 CHRONIC BILATERAL LOW BACK PAIN WITH BILATERAL SCIATICA: Primary | ICD-10-CM

## 2024-04-11 DIAGNOSIS — M51.36 BULGING LUMBAR DISC: ICD-10-CM

## 2024-04-11 DIAGNOSIS — M75.41 SHOULDER IMPINGEMENT SYNDROME, RIGHT: ICD-10-CM

## 2024-04-11 DIAGNOSIS — M54.41 CHRONIC BILATERAL LOW BACK PAIN WITH BILATERAL SCIATICA: Primary | ICD-10-CM

## 2024-04-11 PROCEDURE — 99214 OFFICE O/P EST MOD 30 MIN: CPT | Performed by: PHYSICAL MEDICINE & REHABILITATION

## 2024-04-11 ASSESSMENT — PATIENT HEALTH QUESTIONNAIRE - PHQ9
SUM OF ALL RESPONSES TO PHQ QUESTIONS 1-9: 0
1. LITTLE INTEREST OR PLEASURE IN DOING THINGS: NOT AT ALL
2. FEELING DOWN, DEPRESSED OR HOPELESS: NOT AT ALL
SUM OF ALL RESPONSES TO PHQ QUESTIONS 1-9: 0
SUM OF ALL RESPONSES TO PHQ9 QUESTIONS 1 & 2: 0
SUM OF ALL RESPONSES TO PHQ QUESTIONS 1-9: 0
SUM OF ALL RESPONSES TO PHQ QUESTIONS 1-9: 0

## 2024-04-11 NOTE — PROGRESS NOTES
[facility]  [facility] [facility], [address]  [place] [address]  Phone: [phone]  Fax: [phone]      Patient: Danielle Barraza                                                                              MRN: [id number]        YOB: 1980          AGE: 43 y.o.             PCP: Nerissa Mason DO  Date:  04/11/24    Reason for Consultation: Back Pain (Lumbar/)      HPI:  Danielle Barraza is a 43 y.o. female with relevant PMH of DM, HTN  who presents with low back pain. The pain began over 10 years ago  Denies any precipitating incident or trauma.   She had an MRI of her lumbar spine 1/23/234 which demonstrates L4-5 disc bulge with bilateral L5 lateral recess narrowing, L5-S1 disc bulge with mass effect on right S1 nerve root.    She also reported over 6 months of right shoulder pain. Pain occurs mainly with overhead activity, lifting and reaching.  She has been doing home exercises with weights which help     Neurologic symptoms: No numbness, tingling, weakness, bowel or bladder changes.  No recent falls      Location: The pain is located in the low back pain 2. Right shoulder    Radiation: The pain does radiate down the left >right leg to posterior thigh.    Pain Score: Currently: 7/10    Quality: Pain is of a aching, cramping, stabbing, stiff quality.    Aggravating: Pain is exacerbated by walking and sitting  Alleviating: The pain is alleviated by lying down    Prior Treatments:  Physical therapy:  Physical therapy 10/25/2023- 12/11/2023 2 x per week   Injections:Yes ADAN- >10 years ago  Surgery:No  Previous Medications: gabapentin 100mg - did not like how it made her feel   Current Medications: ibuprofen 800mg  Previous work-up has included:   Images: The imaging results as well as the actual images of the studies below were reviewed, visualized and interpreted by me.    X-ray lumbar spine 7/19/2023  VERTEBRAE AND ALIGNMENT: Stable vertebral body heights.

## 2024-04-11 NOTE — PROGRESS NOTES
Danielle JIMÉNEZ Christi presents today for   Chief Complaint   Patient presents with    Back Pain     Lumbar         Is someone accompanying this pt? no    Is the patient using any DME equipment during OV? no    Depression Screening:       No data to display                Learning Assessment:  Failed to redirect to the Timeline version of the [employer] Smart[employer].    Abuse Screening:       No data to display                Fall Risk  Failed to redirect to the Timeline version of the [employer] SmartLink.    OPIOID RISK TOOL  Failed to redirect to the Timeline version of the [employer] SmartLink.    Coordination of Care:  1. Have you been to the ER, urgent care clinic since your last visit? no  Hospitalized since your last visit? no    2. Have you seen or consulted any other health care providers outside of the [facility] since your last visit? no Include any pap smears or colon screening. No

## 2024-05-08 ENCOUNTER — OFFICE VISIT (OUTPATIENT)
Facility: CLINIC | Age: 44
End: 2024-05-08
Payer: MEDICAID

## 2024-05-08 VITALS
OXYGEN SATURATION: 96 % | HEART RATE: 101 BPM | WEIGHT: 245.4 LBS | BODY MASS INDEX: 34.35 KG/M2 | RESPIRATION RATE: 13 BRPM | DIASTOLIC BLOOD PRESSURE: 83 MMHG | HEIGHT: 71 IN | SYSTOLIC BLOOD PRESSURE: 110 MMHG | TEMPERATURE: 97 F

## 2024-05-08 DIAGNOSIS — E11.9 TYPE 2 DIABETES MELLITUS WITHOUT COMPLICATION, WITHOUT LONG-TERM CURRENT USE OF INSULIN (HCC): Primary | ICD-10-CM

## 2024-05-08 DIAGNOSIS — E55.9 VITAMIN D DEFICIENCY: ICD-10-CM

## 2024-05-08 DIAGNOSIS — D50.9 IRON DEFICIENCY ANEMIA, UNSPECIFIED IRON DEFICIENCY ANEMIA TYPE: ICD-10-CM

## 2024-05-08 DIAGNOSIS — I10 ESSENTIAL HYPERTENSION: ICD-10-CM

## 2024-05-08 DIAGNOSIS — E66.9 OBESITY (BMI 30.0-34.9): ICD-10-CM

## 2024-05-08 DIAGNOSIS — E78.5 HYPERLIPIDEMIA, UNSPECIFIED HYPERLIPIDEMIA TYPE: ICD-10-CM

## 2024-05-08 LAB
A/G RATIO: 1.5 RATIO (ref 1.1–2.6)
ALBUMIN: 4.4 G/DL (ref 3.5–5)
ALP BLD-CCNC: 47 U/L (ref 25–115)
ALT SERPL-CCNC: 15 U/L (ref 5–40)
ANION GAP SERPL CALCULATED.3IONS-SCNC: 15 MMOL/L (ref 3–15)
AST SERPL-CCNC: 11 U/L (ref 10–37)
BASOPHILS ABSOLUTE: 0 K/UL (ref 0–0.2)
BASOPHILS RELATIVE PERCENT: 1 % (ref 0–2)
BILIRUB SERPL-MCNC: 0.7 MG/DL (ref 0.2–1.2)
BUN BLDV-MCNC: 8 MG/DL (ref 6–22)
CALCIUM SERPL-MCNC: 9.7 MG/DL (ref 8.4–10.5)
CHLORIDE BLD-SCNC: 96 MMOL/L (ref 98–110)
CHOLESTEROL, TOTAL: 144 MG/DL (ref 110–200)
CHOLESTEROL/HDL RATIO: 3.4 (ref 0–5)
CO2: 25 MMOL/L (ref 20–32)
CREAT SERPL-MCNC: 0.6 MG/DL (ref 0.5–1.2)
CREATININE URINE: 137 MG/DL
EOSINOPHIL # BLD: 1 % (ref 0–6)
EOSINOPHILS ABSOLUTE: 0.1 K/UL (ref 0–0.5)
ESTIMATED AVERAGE GLUCOSE: 186 MG/DL (ref 91–123)
FERRITIN: 44 NG/ML (ref 10–291)
GFR, ESTIMATED: >60 ML/MIN/1.73 SQ.M.
GLOBULIN: 3 G/DL (ref 2–4)
GLUCOSE: 174 MG/DL (ref 70–99)
HBA1C MFR BLD: 8.1 % (ref 4.8–5.6)
HCT VFR BLD CALC: 40.6 % (ref 35.1–46.5)
HDLC SERPL-MCNC: 42 MG/DL
HEMOGLOBIN: 12.9 G/DL (ref 11.7–15.5)
IRON % SATURATION: 19 % (ref 20–50)
IRON: 64 MCG/DL (ref 30–160)
LDL CHOLESTEROL: 77 MG/DL (ref 50–99)
LDL/HDL RATIO: 1.8
LYMPHOCYTES # BLD: 28 % (ref 20–45)
LYMPHOCYTES ABSOLUTE: 2.4 K/UL (ref 1–4.8)
MCH RBC QN AUTO: 27 PG (ref 26–34)
MCHC RBC AUTO-ENTMCNC: 32 G/DL (ref 31–36)
MCV RBC AUTO: 84 FL (ref 80–99)
MICROALB/CREAT RATIO (UG/MG CREAT.): NORMAL
MICROALBUMIN/CREAT 24H UR: <12 MG/L (ref 0.1–17)
MONOCYTES ABSOLUTE: 0.5 K/UL (ref 0.1–1)
MONOCYTES: 6 % (ref 3–12)
NEUTROPHILS ABSOLUTE: 5.3 K/UL (ref 1.8–7.7)
NEUTROPHILS: 64 % (ref 40–75)
NON-HDL CHOLESTEROL: 102 MG/DL
PDW BLD-RTO: 14.6 % (ref 10–15.5)
PLATELET # BLD: 385 K/UL (ref 140–440)
PMV BLD AUTO: 10.6 FL (ref 9–13)
POTASSIUM SERPL-SCNC: 3.9 MMOL/L (ref 3.5–5.5)
RBC # BLD: 4.84 M/UL (ref 3.8–5.2)
SODIUM BLD-SCNC: 136 MMOL/L (ref 133–145)
TOTAL IRON BINDING CAPACITY: 345 MCG/DL (ref 228–428)
TOTAL PROTEIN: 7.4 G/DL (ref 6.4–8.3)
TRIGL SERPL-MCNC: 122 MG/DL (ref 40–149)
UIBC: 281 MCG/DL (ref 110–370)
VITAMIN D 25-HYDROXY: 77.2 NG/ML (ref 32–100)
VLDLC SERPL CALC-MCNC: 24 MG/DL (ref 8–30)
WBC # BLD: 8.4 K/UL (ref 4–11)

## 2024-05-08 PROCEDURE — 3079F DIAST BP 80-89 MM HG: CPT | Performed by: FAMILY MEDICINE

## 2024-05-08 PROCEDURE — 99215 OFFICE O/P EST HI 40 MIN: CPT | Performed by: FAMILY MEDICINE

## 2024-05-08 PROCEDURE — 3052F HG A1C>EQUAL 8.0%<EQUAL 9.0%: CPT | Performed by: FAMILY MEDICINE

## 2024-05-08 PROCEDURE — 3074F SYST BP LT 130 MM HG: CPT | Performed by: FAMILY MEDICINE

## 2024-05-08 RX ORDER — GLIPIZIDE 5 MG/1
5 TABLET, FILM COATED, EXTENDED RELEASE ORAL DAILY
Qty: 90 TABLET | Refills: 1 | Status: SHIPPED | OUTPATIENT
Start: 2024-05-08

## 2024-05-08 RX ORDER — GLIPIZIDE 5 MG/1
5 TABLET, FILM COATED, EXTENDED RELEASE ORAL DAILY
Qty: 30 TABLET | Refills: 3 | Status: SHIPPED | OUTPATIENT
Start: 2024-05-08 | End: 2024-05-08 | Stop reason: CLARIF

## 2024-05-08 RX ORDER — LISINOPRIL AND HYDROCHLOROTHIAZIDE 12.5; 1 MG/1; MG/1
1 TABLET ORAL DAILY
Qty: 90 TABLET | Refills: 1 | Status: SHIPPED | OUTPATIENT
Start: 2024-05-08

## 2024-05-08 RX ORDER — FERROUS SULFATE 325(65) MG
1 TABLET ORAL 2 TIMES DAILY
Qty: 180 TABLET | Refills: 1 | Status: SHIPPED | OUTPATIENT
Start: 2024-05-08

## 2024-05-08 RX ORDER — CHOLECALCIFEROL (VITAMIN D3) 1250 MCG
CAPSULE ORAL
Qty: 12 CAPSULE | Refills: 1 | Status: SHIPPED | OUTPATIENT
Start: 2024-05-08

## 2024-05-08 SDOH — ECONOMIC STABILITY: HOUSING INSECURITY
IN THE LAST 12 MONTHS, WAS THERE A TIME WHEN YOU DID NOT HAVE A STEADY PLACE TO SLEEP OR SLEPT IN A SHELTER (INCLUDING NOW)?: NO

## 2024-05-08 SDOH — ECONOMIC STABILITY: FOOD INSECURITY: WITHIN THE PAST 12 MONTHS, YOU WORRIED THAT YOUR FOOD WOULD RUN OUT BEFORE YOU GOT MONEY TO BUY MORE.: NEVER TRUE

## 2024-05-08 SDOH — ECONOMIC STABILITY: FOOD INSECURITY: WITHIN THE PAST 12 MONTHS, THE FOOD YOU BOUGHT JUST DIDN'T LAST AND YOU DIDN'T HAVE MONEY TO GET MORE.: NEVER TRUE

## 2024-05-08 SDOH — ECONOMIC STABILITY: INCOME INSECURITY: HOW HARD IS IT FOR YOU TO PAY FOR THE VERY BASICS LIKE FOOD, HOUSING, MEDICAL CARE, AND HEATING?: NOT HARD AT ALL

## 2024-05-08 ASSESSMENT — PATIENT HEALTH QUESTIONNAIRE - PHQ9
SUM OF ALL RESPONSES TO PHQ QUESTIONS 1-9: 0
SUM OF ALL RESPONSES TO PHQ QUESTIONS 1-9: 0
1. LITTLE INTEREST OR PLEASURE IN DOING THINGS: NOT AT ALL
SUM OF ALL RESPONSES TO PHQ9 QUESTIONS 1 & 2: 0
SUM OF ALL RESPONSES TO PHQ QUESTIONS 1-9: 0
SUM OF ALL RESPONSES TO PHQ QUESTIONS 1-9: 0
2. FEELING DOWN, DEPRESSED OR HOPELESS: NOT AT ALL

## 2024-05-08 NOTE — PROGRESS NOTES
\"Have you been to the ER, urgent care clinic since your last visit?  Hospitalized since your last visit?\"    NO    “Have you seen or consulted any other health care providers outside of [facility] since your last visit?”    NO            Click Here for Release of Records Request

## 2024-05-08 NOTE — PROGRESS NOTES
Danielle Barraza (:  1980) is a 43 y.o. female,Established patient, here for evaluation of the following chief complaint(s):  Establish Care      Assessment & Plan   1. Type 2 diabetes mellitus without complication, without long-term current use of insulin (HCC)  -     Hemoglobin A1C; Future  -     Lipid Panel; Future  -     Microalbumin / Creatinine Urine Ratio; Future  -     Comprehensive Metabolic Panel; Future  -     glipiZIDE (GLUCOTROL XL) 5 MG extended release tablet; Take 1 tablet by mouth daily, Disp-90 tablet, R-1Normal  2. Essential hypertension  -     Hemoglobin A1C; Future  -     Lipid Panel; Future  -     Microalbumin / Creatinine Urine Ratio; Future  -     Comprehensive Metabolic Panel; Future  3. Hyperlipidemia, unspecified hyperlipidemia type  4. Obesity (BMI 30.0-34.9)  -     Hemoglobin A1C; Future  -     Lipid Panel; Future  -     Comprehensive Metabolic Panel; Future  5. Vitamin D deficiency  -     Vitamin D 25 Hydroxy; Future  -     Cholecalciferol (VITAMIN D3) 1.25 MG (55700 UT) CAPS; 1 tablet by mouth once weekly, Disp-12 capsule, R-1Normal  6. Iron deficiency anemia, unspecified iron deficiency anemia type  -     CBC with Auto Differential; Future  -     Ferritin; Future  -     Iron and TIBC; Future  -     FEROSUL 325 (65 Fe) MG tablet; Take 1 tablet by mouth 2 times daily, Disp-180 tablet, R-1, DAWNormal      DM-  Uncontrolled. Last A1c was 9.5. Check A1c today.   Expect for it to be high.  Trulicity is on back order.  Patient unable to tolerate Metformin.   Cannot tolerate Jardiance, nor Tradjenta .  Insurance won't cover  Glyxambi which patient can tolerate.  Patient Started on Glipizide XL 5 mg a day. Has a hx of remote insulin use. Start glipizide XL 5 mg a day.  Will adjust as needed pending lab work.      HTN-    Controlled.  Continue current meds and doses.   Modify diet.  Limit salt intake to 2 grams  a day.  Advised  aerobic exercise for 30 minutes 3 to 5 times a week.

## 2024-05-10 ENCOUNTER — TELEPHONE (OUTPATIENT)
Age: 44
End: 2024-05-10

## 2024-05-10 NOTE — TELEPHONE ENCOUNTER
CALLED PT TO RESCHEDULE BLOCK FROM 4/26 AND 5/2. PT STATED THAT HER ENDOCRINOLOGIST DID NOT WANT HER TO GO FORWARD DUE TO INCREASED BLOOD SUGAR. PT STATRTED ON NEW MEDS AND WILL FOLLOW UP WITH DR SCOTT AT SCHEDULED APPT IN JUNE

## 2024-05-13 ENCOUNTER — TELEPHONE (OUTPATIENT)
Facility: CLINIC | Age: 44
End: 2024-05-13

## 2024-05-13 NOTE — TELEPHONE ENCOUNTER
Pt stated that her script glipiZIDE (GLUCOTROL XL) 5 MG extended release tablet  was cancelled electronically twice.     She stated that her pharmacy told her that Dr. Rueda would have to call the pharmacy for it to be filled.    Pt is also inquiring about her recent lab results.    Please reach out to Pt.

## 2024-05-14 NOTE — TELEPHONE ENCOUNTER
Spoke with pt, she states its going on a month with no diabetes medication. States per the pharmacy the Glipizide will have to be verbally called in being that it was canceled twice in the system. Called pharmacy and gave verbal. Please place result note for pt labs. Pt states with her A1c she don't think the low dose of glipizide will be enough.

## 2024-05-14 NOTE — TELEPHONE ENCOUNTER
Patient is calling in to speak with a nurse, states this is an urgent issue and needs to speak with someone today.

## 2024-05-24 ENCOUNTER — TELEPHONE (OUTPATIENT)
Facility: CLINIC | Age: 44
End: 2024-05-24

## 2024-05-24 DIAGNOSIS — E11.9 TYPE 2 DIABETES MELLITUS WITHOUT COMPLICATION, WITHOUT LONG-TERM CURRENT USE OF INSULIN (HCC): Primary | ICD-10-CM

## 2024-05-24 RX ORDER — INSULIN GLARGINE 100 [IU]/ML
INJECTION, SOLUTION SUBCUTANEOUS
Qty: 5 ADJUSTABLE DOSE PRE-FILLED PEN SYRINGE | Refills: 1 | Status: SHIPPED | OUTPATIENT
Start: 2024-05-24

## 2024-05-24 RX ORDER — FLURBIPROFEN SODIUM 0.3 MG/ML
SOLUTION/ DROPS OPHTHALMIC
Qty: 100 EACH | Refills: 1 | Status: SHIPPED | OUTPATIENT
Start: 2024-05-24

## 2024-05-24 NOTE — TELEPHONE ENCOUNTER
Please return call to patient she is having concerns regarding her BS she states that the Glipizide is not effective her sugars are still running in the 400 range and she is not eating like she is supposed to patient also states that she is having headaches as well please return call when available

## 2024-05-25 NOTE — TELEPHONE ENCOUNTER
Patient contacted.  Patient notes that her blood sugars have been ranging 300-400 despite taking glipizide ER 5 mg once a day.  Due to patient limitation on the oral regimens that she can take and tolerate to treat her diabetes, we have decided to go with long acting insulin as a treatment.  Will start Lantus 20 units once a day.  Patient told to hold taking glipizide ER 5 mg a day for the first couple of doses of the Lantus.  After taking the first 2-3  doses of Lantus, if her blood sugars were still ranging in the 200 range, she was instructed to  take the glipizide XL 5 mg dose.    To continue to monitor her Blood sugars closely. Patient instructed to contact the office on Tuesday or sooner over the weekend if needed for any blood sugars less than 70 or with continued high readings.      26-Aug-2017

## 2024-05-25 NOTE — TELEPHONE ENCOUNTER
Spoke with patient.  Patient was taking her glipizide XL 5 mg.  She reports that her blood sugars are still ranging in the 3-400 range.  She notes that she does have a headache.  Due to patient's inability to tolerate multiple diabetic oral meds due to side effects, we opted to go with long-acting insulin (Lantus).  Patient will start 20 units subcutaneously once a day.  She is to hold off her dose of glipizide XL 5 mg for the first 2 to 3 days.  She was instructed if her blood sugars were greater than 200 that we would consider adding the glipizide XL 5 mg once a day.  She is to watch out for signs of hypoglycemia (shakiness, sweating, nausea, fatigue, slurred speech, blurred vision, lightheadedness, numbness and tingling of lips, tongue or cheek.).  Patient to contact the office if blood sugars are less than 70.  Patient needs to make sure she eats her 3 meals while taking the Lantus.

## 2024-06-06 ENCOUNTER — OFFICE VISIT (OUTPATIENT)
Facility: CLINIC | Age: 44
End: 2024-06-06

## 2024-06-06 ENCOUNTER — HOSPITAL ENCOUNTER (OUTPATIENT)
Dept: WOMENS IMAGING | Facility: HOSPITAL | Age: 44
Discharge: HOME | End: 2024-06-06
Payer: MEDICAID

## 2024-06-06 VITALS
HEIGHT: 71 IN | DIASTOLIC BLOOD PRESSURE: 87 MMHG | SYSTOLIC BLOOD PRESSURE: 136 MMHG | OXYGEN SATURATION: 100 % | RESPIRATION RATE: 12 BRPM | HEART RATE: 92 BPM | TEMPERATURE: 96.9 F | WEIGHT: 248.6 LBS | BODY MASS INDEX: 34.8 KG/M2

## 2024-06-06 DIAGNOSIS — E11.9 TYPE 2 DIABETES MELLITUS WITHOUT COMPLICATION, WITHOUT LONG-TERM CURRENT USE OF INSULIN (HCC): Primary | ICD-10-CM

## 2024-06-06 DIAGNOSIS — Z12.31 BREAST CANCER SCREENING BY MAMMOGRAM: ICD-10-CM

## 2024-06-06 PROCEDURE — 77067 SCR MAMMO BI INCL CAD: CPT

## 2024-06-06 RX ORDER — INSULIN GLARGINE 100 [IU]/ML
INJECTION, SOLUTION SUBCUTANEOUS
Qty: 5 ADJUSTABLE DOSE PRE-FILLED PEN SYRINGE | Refills: 2 | Status: SHIPPED | OUTPATIENT
Start: 2024-06-06

## 2024-06-06 ASSESSMENT — PATIENT HEALTH QUESTIONNAIRE - PHQ9
SUM OF ALL RESPONSES TO PHQ QUESTIONS 1-9: 0
SUM OF ALL RESPONSES TO PHQ QUESTIONS 1-9: 0
1. LITTLE INTEREST OR PLEASURE IN DOING THINGS: NOT AT ALL
SUM OF ALL RESPONSES TO PHQ QUESTIONS 1-9: 0
SUM OF ALL RESPONSES TO PHQ QUESTIONS 1-9: 0
SUM OF ALL RESPONSES TO PHQ9 QUESTIONS 1 & 2: 0
2. FEELING DOWN, DEPRESSED OR HOPELESS: NOT AT ALL

## 2024-06-17 ASSESSMENT — ENCOUNTER SYMPTOMS
SHORTNESS OF BREATH: 1
BACK PAIN: 1
ABDOMINAL PAIN: 0

## 2024-06-17 NOTE — PROGRESS NOTES
\"Have you been to the ER, urgent care clinic since your last visit?  Hospitalized since your last visit?\"    NO    “Have you seen or consulted any other health care providers outside of [facility] since your last visit?”    NO            Click Here for Release of Records Request   
once a day.    B-D UF III MINI PEN NEEDLES 31G X 5 MM MISC USE 1 PEN NEEDLE SUBCUTANEOUSLY ONCE DAILY    lisinopril-hydroCHLOROthiazide (PRINZIDE;ZESTORETIC) 10-12.5 MG per tablet Take 1 tablet by mouth daily    glipiZIDE (GLUCOTROL XL) 5 MG extended release tablet Take 1 tablet by mouth daily    Cholecalciferol (VITAMIN D3) 1.25 MG (60810 UT) CAPS 1 tablet by mouth once weekly    FEROSUL 325 (65 Fe) MG tablet Take 1 tablet by mouth 2 times daily    amLODIPine (NORVASC) 2.5 MG tablet TAKE 1 TABLET BY MOUTH ONCE DAILY FOR 90 DAYS    atorvastatin (LIPITOR) 10 MG tablet Take 1 tablet by mouth daily    ibuprofen (ADVIL;MOTRIN) 800 MG tablet Take 1 tablet by mouth 2 times daily as needed for Pain (Patient not taking: Reported on 5/8/2024)    methocarbamol (ROBAXIN) 500 MG tablet Take 1 tablet by mouth 2 times daily as needed (spasm) (Patient not taking: Reported on 5/8/2024)    TRULICITY 0.75 MG/0.5ML SOPN INJECT 0.75 MG SUBCUTANEOUSLY ONCE A WEEK (Patient not taking: Reported on 5/8/2024)     No current facility-administered medications for this visit.         Allergies and Intolerances:   Allergies   Allergen Reactions    Latex Other (See Comments)    Adhesive Tape      Other reaction(s): rash/itching       Family History:   Family History   Problem Relation Age of Onset    Diabetes Other        Social History:   She  reports that she has never smoked. She has never used smokeless tobacco.  She  reports no history of alcohol use.    Review of Systems   Constitutional:  Negative for chills and fever.   Eyes:  Negative for visual disturbance.   Respiratory:  Positive for shortness of breath (on occasions).    Cardiovascular:  Negative for chest pain, palpitations and leg swelling.   Gastrointestinal:  Negative for abdominal pain.   Genitourinary:  Negative for difficulty urinating.   Musculoskeletal:  Positive for back pain.   Neurological:  Negative for dizziness, numbness and headaches.          Objective  /87

## 2024-07-05 ENCOUNTER — OFFICE VISIT (OUTPATIENT)
Facility: CLINIC | Age: 44
End: 2024-07-05
Payer: MEDICAID

## 2024-07-05 VITALS
HEIGHT: 71 IN | DIASTOLIC BLOOD PRESSURE: 89 MMHG | SYSTOLIC BLOOD PRESSURE: 138 MMHG | WEIGHT: 247.8 LBS | RESPIRATION RATE: 13 BRPM | OXYGEN SATURATION: 95 % | BODY MASS INDEX: 34.69 KG/M2 | TEMPERATURE: 96.3 F | HEART RATE: 82 BPM

## 2024-07-05 DIAGNOSIS — E66.9 OBESITY (BMI 30.0-34.9): ICD-10-CM

## 2024-07-05 DIAGNOSIS — E11.9 TYPE 2 DIABETES MELLITUS WITHOUT COMPLICATION, WITHOUT LONG-TERM CURRENT USE OF INSULIN (HCC): Primary | ICD-10-CM

## 2024-07-05 PROCEDURE — 99213 OFFICE O/P EST LOW 20 MIN: CPT | Performed by: FAMILY MEDICINE

## 2024-07-05 PROCEDURE — 3052F HG A1C>EQUAL 8.0%<EQUAL 9.0%: CPT | Performed by: FAMILY MEDICINE

## 2024-07-05 RX ORDER — GLIPIZIDE 10 MG/1
10 TABLET, FILM COATED, EXTENDED RELEASE ORAL DAILY
Qty: 90 TABLET | Refills: 1 | Status: SHIPPED | OUTPATIENT
Start: 2024-07-05

## 2024-07-05 ASSESSMENT — PATIENT HEALTH QUESTIONNAIRE - PHQ9
1. LITTLE INTEREST OR PLEASURE IN DOING THINGS: NOT AT ALL
SUM OF ALL RESPONSES TO PHQ QUESTIONS 1-9: 0
2. FEELING DOWN, DEPRESSED OR HOPELESS: NOT AT ALL
SUM OF ALL RESPONSES TO PHQ9 QUESTIONS 1 & 2: 0
SUM OF ALL RESPONSES TO PHQ QUESTIONS 1-9: 0

## 2024-07-05 NOTE — PROGRESS NOTES
Inpatient Rehabilitation - Physical Therapy Treatment Note  [facility]     Patient Name: Vangie Lopez  : 1951  MRN: [id number]  Today's Date: 2017  Onset of Illness/Injury or Date of Surgery Date: 17  Date of Referral to PT: 17  Referring Physician: Dr. Stephen    Admit Date: 2017    Visit Dx:    ICD-10-CM ICD-9-CM   1. Abnormality of gait and mobility R26.9 781.2   2. Muscle weakness (generalized) M62.81 728.87   3. Impaired mobility and ADLs Z74.09 799.89     Patient Active Problem List   Diagnosis   • Encounter for screening for malignant neoplasm of colon   • Closed displaced comminuted fracture of right patella   • Closed displaced fracture of navicular bone of left foot   • Closed dislocation of navicular bone of left foot   • Patella fracture   • Essential hypertension   • Mixed hyperlipidemia   • Osteoarthritis of hands, bilateral   • BPH (benign prostatic hyperplasia)   • Glaucoma   • Hyperlipidemia   • Closed fracture of navicular bone with dislocation of perilunate joint of left wrist   • Fracture of patella, right, closed   • Multiple trauma   • Encounter for rehabilitation   • History of kidney stones               Adult Rehabilitation Note       17 1440 17 1012 17 1455    Rehab Assessment/Intervention    Discipline physical therapy assistant  -RW physical therapy assistant  -RW occupational therapy assistant  -RC    Document Type therapy note (daily note)  -RW therapy note (daily note)  -RW therapy note (daily note)  -RC    Subjective Information agree to therapy  -RW agree to therapy  -RW agree to therapy  -RC    Patient Effort, Rehab Treatment good  -RW good  -RW good  -RC    Precautions/Limitations non-weight bearing status;brace on when up  -RW non-weight bearing status   brace on at all times  -RW fall precautions;non-weight bearing status;brace on when up   brace on all time elevate r lle all time l le after up 3o mi  -RC    Specific 
\"Have you been to the ER, urgent care clinic since your last visit?  Hospitalized since your last visit?\"    NO    “Have you seen or consulted any other health care providers outside of [facility] since your last visit?”    NO            Click Here for Release of Records Request   
Treatment Considerations   --   in w/c only 2 hr at a time, keep r knee straght  -RC    Recorded by [RW] Balaji Malhotra PTA [RW] Balaji Malhotra PTA [RC] Shruthi Jean-Baptiste, VILLALPANDO/L    Pain Assessment    Pain Assessment No/denies pain  -RW      Pain Score  --   c/o occ pain in left flank  -RW 0  -RC    Post Pain Score   0  -RC    Recorded by [RW] Balaji Malhotra PTA [RW] Balaji Malhotra, DARIEN [RC] Shruthi Jean-Baptiste, VILLALPANDO/L    Cognitive Assessment/Intervention    Current Cognitive/Communication Assessment functional  -RW functional  -RW     Orientation Status oriented x 4  -RW oriented x 4  -RW     Follows Commands/Answers Questions 100% of the time  -% of the time  -RW     Personal Safety Interventions gait belt  -RW gait belt  -RW     Recorded by [RW] Balaji Malhotra PTA [RW] Balaji Malhotra PTA     Mobility Assessment/Training    Left Lower Extremity Weight-Bearing non weight-bearing  -RW non weight-bearing  -RW     Right Lower Extremity Weight-Bearing non weight-bearing  -RW non weight-bearing  -RW     Recorded by [RW] Balaji Malhotra PTA [RW] Balaji Malhotra PTA     Bed Mobility, Assessment/Treatment    Bed Mobility, Assistive Device bed rails;head of bed elevated  -RW bed rails;head of bed elevated  -RW     Bed Mob, Supine to Sit, Rhea conditional independence  -RW supervision required  -RW     Bed Mob, Sit to Supine, Rhea  supervision required  -RW     Recorded by [RW] Balaji Malhotra PTA [RW] Balaji Malhotra PTA     Transfer Assessment/Treatment    Transfers, Bed-Chair Rhea contact guard assist  -RW minimum assist (75% patient effort);contact guard assist  -RW     Transfers, Chair-Bed Rhea  minimum assist (75% patient effort);contact guard assist  -RW minimum assist (75% patient effort)  -RC    Transfers, Bed-Chair-Bed, Assist Device sliding board  -RW sliding board  -RW sliding board  -RC    Toilet Transfer, Rhea contact guard assist  -RW      Toilet Transfer, Assistive 
Device bedside commode with drop arms   sliding bd  -RW      Transfer, Comment  practiced 2-3 times using leg lift strap o right leg  -RW     Recorded by [RW] Balaji Malhotra PTA [RW] Balaji Malhotra PTA [RC] NAV HerringA/L    Gait Assessment/Treatment    Gait, [place] Level not appropriate to assess  -RW not appropriate to assess  -RW     Recorded by [RW] Balaji Malhotra PTA [RW] Balaji Malhotra PTA     Stairs Assessment/Treatment    Stairs, [place] Level not appropriate to assess  -RW not appropriate to assess  -RW     Recorded by [RW] Balaji Malhotra PTA [RW] Balaji Malhotra PTA     Wheelchair Training/Management    Wheelchair Transfer Type  lateral transfer  -RW --   sliding board to l side  -RC    Wheelchair Task Training Comment   --   requires someont to hold r le and vc's  -RC    Wheelchair, Distance Propelled  150 ind  -  -RC    Recorded by  [RW] Balaji Malhotra PTA [RC] NAV HerringA/L    Balance Skills Training    Sitting-Balance Activities Forward lean;Reaching for objects;Reaching across midline  -RW      Recorded by [RW] Balaji Malhotra PTA      Therapy Exercises    Right Lower Extremity AROM:;ankle pumps/circles;20 reps;supine  -RW AROM:;15 reps;sitting;ankle pumps/circles;quad sets;glut sets  -RW     Left Lower Extremity AROM:;heel slides;supine;20 reps  -RW AROM:;15 reps;sitting;glut sets;quad sets  -RW     Bilateral Lower Extremities AROM:;20 reps;supine;glut sets;quad sets  -RW      Bilateral Upper Extremity   AROM:;15 reps  -RC    BUE Resistance   theraband   rowing blue band 2 sets  -RC    Recorded by [RW] Balaji Malhotra PTA [RW] Balaji Malhotra PTA [RC] Shruthi Jean-Baptiste VILLALPANDO/L    Positioning and Restraints    Pre-Treatment Position in bed  -RW in bed  -RW sitting in chair/recliner  -RC    Post Treatment Position bsc  -RW wheelchair  -RW bed  -RC    In Bed   call light within reach;encouraged to call for assist;with nsg  -RC    On BS commode call light within 
alcohol use.      Review of Systems   Constitutional:  Negative for chills and fever.   Eyes:  Negative for visual disturbance.   Respiratory:  Positive for shortness of breath (on occasions).    Cardiovascular:  Positive for palpitations (on occasions, usually associated with SOB.   Not today). Negative for chest pain and leg swelling.   Gastrointestinal:  Positive for abdominal pain (on occasions, not today.).   Genitourinary:  Negative for difficulty urinating.   Musculoskeletal:  Positive for back pain.   Neurological:  Positive for headaches (occasional, not today). Negative for dizziness and numbness.          Objective    /89 (Site: Left Upper Arm, Position: Sitting, Cuff Size: Large Adult)   Pulse 82   Temp (!) 96.3 °F (35.7 °C) (Temporal)   Resp 13   Ht 1.803 m (5' 11\")   Wt 112.4 kg (247 lb 12.8 oz)   LMP 06/16/2024 (Exact Date)   SpO2 95%   BMI 34.56 kg/m²     Physical Exam  Vitals and nursing note reviewed.   Constitutional:       Appearance: Normal appearance. She is obese.   HENT:      Head: Normocephalic and atraumatic.   Eyes:      Extraocular Movements: Extraocular movements intact.   Cardiovascular:      Rate and Rhythm: Normal rate and regular rhythm.   Pulmonary:      Effort: Pulmonary effort is normal.      Breath sounds: Normal breath sounds.   Abdominal:      General: Abdomen is flat.      Palpations: Abdomen is soft.   Musculoskeletal:      Right lower leg: No edema.      Left lower leg: No edema.   Skin:     General: Skin is warm and dry.   Neurological:      General: No focal deficit present.      Mental Status: She is alert and oriented to person, place, and time.                  An electronic signature was used to authenticate this note.    --Honey Rueda MD   
reach;encouraged to call for assist  -RW      Recorded by [RW] Balaji Malhotra PTA [RW] Balaji Malhotra PTA [RC] Shruthiedgar Jean-Baptiste, VILLALPANDO/FREEDOM      09/12/17 1028 09/11/17 1040       Rehab Assessment/Intervention    Discipline physical therapy assistant  -TA physical therapy assistant  -AM     Document Type therapy note (daily note)  -TA therapy note (daily note)  -AM     Subjective Information agree to therapy  -TA agree to therapy;complains of;pain  -AM     Patient Effort, Rehab Treatment good  -TA good  -AM     Symptoms Noted During/After Treatment  none  -AM     Symptoms Noted Comment BLE non weight bearing,knee immobilizer R LE  -TA      Precautions/Limitations fall precautions;non-weight bearing status   Non weight bearing B LE  -TA brace on when up;fall precautions;non-weight bearing status  -AM     Equipment Issued to Patient  gait belt  -AM     Recorded by [TA] Geri Juan PTA [AM] Jim Young PTA     Vital Signs    Pre Systolic BP Rehab 146  -  -AM     Pre Treatment Diastolic BP 73  -TA 78  -AM     Post Systolic BP Rehab 147  -  -AM     Post Treatment Diastolic BP 70  -TA 75  -AM     Pretreatment Heart Rate (beats/min) 102  -  -AM     Intratreatment Heart Rate (beats/min) 96  -TA      Posttreatment Heart Rate (beats/min) 103  -  -AM     Pre SpO2 (%) 98  -TA 95  -AM     O2 Delivery Pre Treatment room air  -TA room air  -AM     Intra SpO2 (%) 98  -TA      Post SpO2 (%) 97  -TA 96  -AM     O2 Delivery Post Treatment  room air  -AM     Pre Patient Position Supine  -TA Sitting  -AM     Intra Patient Position  Supine  -AM     Post Patient Position Supine  -TA Sitting  -AM     Recorded by [TA] Geri Juan PTA [AM] Jim Young PTA     Pain Assessment    Pain Assessment 0-10  -TA 0-10  -AM     Pain Score 0  -TA 6  -AM     Post Pain Score 6  -TA 4  -AM     Pain Type Surgical pain  -TA Acute pain;Surgical pain  -AM     Pain Location Knee  -TA Knee  -AM     Pain Orientation Right  
-TA Right  -AM     Pain Descriptors  Sore  -AM     Pain Frequency  Constant/continuous  -AM     Date Pain First Started  09/06/17  -AM     Clinical Progression  Gradually improving  -AM     Patient's Stated Pain Goal  No pain  -AM     Pain Intervention(s)  Medication (See MAR);Repositioned  -AM     Result of Injury  Yes  -AM     Work-Related Injury  No  -AM     Multiple Pain Sites  No  -AM     Recorded by [TA] Geri Juan PTA [AM] Jim Young PTA     Cognitive Assessment/Intervention    Current Cognitive/Communication Assessment functional  -TA functional  -AM     Orientation Status oriented x 4  -TA oriented x 4  -AM     Follows Commands/Answers Questions 100% of the time  -% of the time  -AM     Personal Safety mild impairment  -TA      Personal Safety Interventions fall prevention program maintained  -TA gait belt;nonskid shoes/slippers when out of bed;supervised activity  -AM     Recorded by [TA] Geri Juan PTA [AM] Jim Young PTA     ROM (Range of Motion)    General ROM  lower extremity range of motion deficits identified  -AM     Recorded by  [AM] Jim Young PTA     General LE Assessment    ROM  knee, right: LE ROM deficit;ankle, left: LE ROM deficit  -AM     Recorded by  [AM] Jim Young PTA     Mobility Assessment/Training    Extremity Weight-Bearing Status  left lower extremity;right lower extremity  -AM     Left Lower Extremity Weight-Bearing  non weight-bearing  -AM     Right Lower Extremity Weight-Bearing  non weight-bearing  -AM     Recorded by  [AM] Jim Young PTA     Bed Mobility, Assessment/Treatment    Bed Mobility, Assistive Device bed rails;head of bed elevated;overhead trapeze  -TA head of bed elevated  -AM     Bed Mobility, Roll Left, [place] minimum assist (75% patient effort);nonverbal cues required (demo/gesture)   x 3  -TA not tested  -AM     Bed Mobility, Roll Right, [place] minimum assist (75% patient effort);verbal cues required   x 3  
-TA not tested  -AM     Bed Mobility, Scoot/Bridge, [place] contact guard assist  -TA not tested  -AM     Bed Mob, Supine to Sit, [place] minimum assist (75% patient effort)   for R LE x 2  -TA contact guard assist  -AM     Bed Mob, Sit to Supine, [place] minimum assist (75% patient effort)    for  R LE x 2  -TA contact guard assist  -AM     Bed Mob, Sidelying to Sit, [place]  not tested  -AM     Bed Mob, Sit to Sidelying, [place]  not tested  -AM     Bed Mobility, Impairments  ROM decreased;strength decreased;pain  -AM     Bed Mobility, Comment pt Assisted to EOB x 2, pt's R  LE supported by PTA  -TA      Recorded by [TA] Geri Juan, PTA [AM] Jim Young PTA     Transfer Assessment/Treatment    Transfers, Bed-Chair [place]  contact guard assist  -AM     Transfers, Chair-Bed [place]  contact guard assist  -AM     Transfers, Bed-Chair-Bed, Assist Device  other (see comments)   none  -AM     Transfers, Sit-Stand [place]  not appropriate to assess  -AM     Transfers, Stand-Sit [place]  not appropriate to assess  -AM     Toilet Transfer, [place]  not tested  -AM     Walk-In Shower Transfer, [place]  not tested  -AM     Bathtub Transfer, [place]  not tested  -AM     Transfer, Maintain Weight Bearing Status  able to maintain weight bearing status  -AM     Transfer, Impairments  ROM decreased;strength decreased;pain  -AM     Recorded by  [AM] Jim Young PTA     Gait Assessment/Treatment    Gait, [place] Level  not appropriate to assess  -AM     Recorded by  [AM] Jim Young PTA     Stairs Assessment/Treatment    Stairs, [place] Level  not appropriate to assess  -AM     Recorded by  [AM] Jim oYung PTA     Therapy Exercises    Right Lower Extremity AROM:;20 reps;ankle pumps/circles;supine   2 sets  -TA      Left Lower Extremity AROM:;20 reps;quad sets;supine   2 sets  -TA      Bilateral Lower Extremities AROM:;20 reps;supine;glut 
sets   2 sets  -TA      Trunk Exercises supine;10 reps   pull-ups  with trapeze bar  -TA      Recorded by [TA] Geri Juan PTA      Orthotics Prosthetics    Additional Documentation  Orthosis Location (Group);Orthosis Management/Training (Group)  -AM     Recorded by  [AM] Jim Young PTA     Orthosis Location    Orthosis Location/Type  lower extremity  -AM     Orthosis, Lower Extremity  Right:;knee immobilizer  -AM     Recorded by  [AM] Jim Young PTA     Orthosis Management/Training    Orthosis Fabrication Detail  Knee Immobilizer  -AM     Orthosis Indications  restrict motion  -AM     Orthosis Wear Schedule  wear full time  -AM     Recorded by  [AM] Jim Young PTA     Positioning and Restraints    Pre-Treatment Position in bed  -TA sitting in chair/recliner  -AM     Post Treatment Position bed  -TA wheelchair  -AM     In Bed supine;call light within reach;with family/caregiver  -TA      In Wheelchair  sitting;call light within reach;encouraged to call for assist;legs elevated  -AM     Recorded by [TA] Geri Juan PTA [AM] Jim Young PTA       User Key  (r) = Recorded By, (t) = Taken By, (c) = Cosigned By    Initials Name Effective Dates    TA Geri Juan, DARIEN 10/17/16 -     AM Jim Young PTA 10/17/16 -     RW Balaji Malhotra, DARIEN 10/17/16 -     RC IRASEMA Herring/FREEDOM 10/17/16 -                 IP PT Goals       09/13/17 1533 09/12/17 1355 09/11/17 1500    Bed Mobility PT LTG    Bed Mobility PT LTG, Date Established   09/11/17  -LM    Bed Mobility PT LTG, Time to Achieve   by discharge  -LM    Bed Mobility PT LTG, Activity Type   supine to sit/sit to supine  -LM    Bed Mobility PT LTG, [place] Level   conditional independence  -LM    Bed Mobility PT Goal  LTG, Assist Device   overhead trapeze;bed rails  -LM    Bed Mobility PT LTG, Date Goal Reviewed 09/13/17  -RW 09/12/17  -LM     Bed Mobility PT LTG, Outcome goal ongoing  -RW goal ongoing  -LM goal ongoing  -LM    
Transfer Training PT LTG    Transfer Training PT LTG, Date Established   09/11/17  -LM    Transfer Training PT LTG, Time to Achieve   by discharge  -LM    Transfer Training PT LTG, Activity Type   bed to chair /chair to bed  -LM    Transfer Training PT LTG, [place] Level   conditional independence  -LM    Transfer Training PT LTG, Assist Device   --   AAD  -LM    Transfer Training PT  LTG, Date Goal Reviewed 09/13/17  -RW 09/12/17  -LM     Transfer Training PT LTG, Outcome goal ongoing  -RW goal ongoing  -LM goal ongoing  -LM    Range of Motion PT LTG    Range of Motion Goal PT LTG, Date Established   09/11/17  -LM    Range of Motion Goal PT LTG, Time to Achieve   by discharge  -LM    Range fo Motion Goal PT LTG, Joint   L knee  -LM    Range of Motion Goal PT LTG, AROM Measure   Knee flex - 110 degrees  -LM    Range of Motion Goal PT LTG, Date Goal Reviewed 09/13/17  -RW 09/12/17  -LM     Range of Motion Goal PT LTG, Outcome goal ongoing  -RW goal ongoing  -LM goal ongoing  -LM    Wheelchair Propulsion PT LTG    Wheelchair Propulsion Goal PT LTG, Date Established  09/12/17  -LM     Wheelchair Propulsion Goal PT LTG, Time to Achieve  by discharge  -LM     Wheelchair Propulsion Goal PT LTG, [place] Level  conditional independence  -LM     Wheelchair Propulsion Goal PT LTG, Distance to Achieve  300 feet  -LM     Wheelchair Propulsion Goal PT LTG, Date Goal Reviewed 09/13/17  -RW      Wheelchair Propulsion Goal PT LTG, Outcome goal ongoing  -RW goal ongoing  -LM     Physical Therapy PT LTG    Physical Therapy PT LTG, Date Established  09/12/17  -LM     Physical Therapy PT LTG, Time to Achieve  by discharge  -LM     Physical Therapy PT LTG, Activity Type  Pt will self propel w/c up/down ramp.  -LM     Physical Therapy PT LTG, [place] Level  conditional independence  -LM     Physical Therapy PT LTG, Date Goal Reviewed 09/13/17  -RW      Physical Therapy PT LTG, Outcome goal ongoing  -RW goal ongoing  -LM  
     09/11/17 1040 09/10/17 1300 09/09/17 1626    Transfer Training PT LTG    Transfer Training PT LTG, Date Established   09/09/17  -    Transfer Training PT LTG, Time to Achieve   by discharge  -    Transfer Training PT LTG, Activity Type   bed to chair /chair to bed  -    Transfer Training PT LTG, Additional Goal   Once MD allows, pt will perform lateral transfer with conditional independence  -    Transfer Training PT  LTG, Date Goal Reviewed 09/11/17  -AM 09/10/17  -A     Transfer Training PT LTG, Outcome goal not met  -AM goal ongoing  -A goal ongoing  -    Transfer Training PT LTG, Reason Goal Not Met discharged from facility  -AM      Patient Education PT LTG    Patient Education PT LTG, Date Established   09/09/17  -    Patient Education PT LTG, Time to Achieve   by discharge  -    Patient Education PT LTG, Education Type   precaution per surgeon;transfers;bed mobility;pain management;home safety  -    Patient Education PT LTG, Date Goal Reviewed 09/11/17  -AM 09/10/17  -A     Patient Education PT LTG Outcome goal met  -AM goal ongoing  -A goal ongoing  -    Caregiver Training PT LTG    Caregiver Training PT LTG, Date Established   09/09/17  -    Caregiver Training PT LTG, Time to Achieve   by discharge  -    Caregiver Training PT LTG, Activity Type   home caregiver will demonstrate understanding assisting pt as needed with transfers/mobility as well as verbalize understanding of home care instructions  -    Caregiver Training PT LTG, Date Goal Reviewed 09/11/17  -AM 09/10/17  -A     Caregiver Training PT LTG, Outcome goal not met  -AM goal ongoing  -A goal ongoing  -    Caregiver Training PT LTG, Reason Goal Not Met discharged from facility  -AM      Physical Therapy PT STG    Physical Therapy PT STG, Date Established   09/09/17  -    Physical Therapy PT STG, Time to Achieve   by discharge  -    Physical Therapy PT STG, Activity Type   Pt will tolerate OOB 3-4 
hours per day  -MAGNO    Physical Therapy PT STG, Date Goal Reviewed  09/10/17  -JCA     Physical Therapy PT STG, Outcome  goal met  -A goal ongoing  -MAGNO      User Key  (r) = Recorded By, (t) = Taken By, (c) = Cosigned By    Initials Name Provider Type     Isela Chris, PT Physical Therapist    MAGNO Lupe Abel, PT Physical Therapist    HELEN Pendleton, PTA Physical Therapy Assistant    SUSIE Young, PTA Physical Therapy Assistant    JOHN Malhotra, PTA Physical Therapy Assistant          Physical Therapy Education     Title: PT OT SLP Therapies (Active)     Topic: Physical Therapy (Active)     Point: Mobility training (Done)    Learning Progress Summary    Learner Readiness Method Response Comment Documented by Status   Patient Acceptance E VU reviewed  non wt bearing and transfering to from [facility] 09/13/17 1140 Done    Acceptance E NR Reviewed transferring towards stronger side and maintaining NWB with activity.  09/12/17 1608 Active    Acceptance E NR  TA 09/12/17 1515 Active    Acceptance E NR Reviewed safety with transfers.  Explained that Dr. Rutherford only wants stretcher chair used at this time.  09/11/17 1636 Active               Point: Precautions (Done)    Learning Progress Summary    Learner Readiness Method Response Comment Documented by Status   Patient Acceptance E VU reviewed  non wt bearing and transfering to from [facility] 09/13/17 1140 Done    Acceptance E NR Reviewed transferring towards stronger side and maintaining NWB with activity.  09/12/17 1608 Active    Acceptance E NR  TA 09/12/17 1515 Active    Acceptance E NR Reviewed safety with transfers.  Explained that Dr. Rutherford only wants stretcher chair used at this time.  09/11/17 1636 Active                      User Key     Initials Effective Dates Name Provider Type Discipline     06/15/16 -  Isela Chris, PT Physical Therapist PT    TA 10/17/16 -  Geri Juan, PTA Physical Therapy Assistant PT    RW 10/17/16 -  Balaji Malohtra, 
PTA Physical Therapy Assistant PT                    PT Recommendation and Plan  Anticipated Discharge Disposition: home with home health  Planned Therapy Interventions: balance training, bed mobility training, home exercise program, motor coordination training, neuromuscular re-education, patient/family education, postural re-education, ROM (Range of Motion), strengthening, stretching, transfer training  PT Frequency: other (see comments) (5-14 times/wk)  Plan of Care Review  Plan Of Care Reviewed With: patient  Outcome Summary/Follow up Plan: pt doing well with transfers and is able to t/f with cga.          Outcome Measures       09/13/17 1100 09/12/17 1500 09/12/17 1355    How much help from another person do you currently need...    Turning from your back to your side while in flat bed without using bedrails? 3  -RW 3  -TA 3  -LM    Moving from lying on back to sitting on the side of a flat bed without bedrails? 3  -RW 3  -TA 3  -LM    Moving to and from a bed to a chair (including a wheelchair)? 3  -RW 3  -TA 3  -LM    Standing up from a chair using your arms (e.g., wheelchair, bedside chair)? 1  -RW 1  -TA 1  -LM    Climbing 3-5 steps with a railing? 1  -RW 1  -TA 1  -LM    To walk in hospital room? 1  -RW 1  -TA 1  -LM    AM-PAC 6 Clicks Score 12  -RW 12  -TA 12  -LM    Functional Assessment    Outcome Measure Options  AM-PAC 6 Clicks Basic Mobility (PT)  -TA AM-PAC 6 Clicks Basic Mobility (PT)  -LM      09/12/17 0755 09/11/17 1500 09/11/17 1040    How much help from another person do you currently need...    Turning from your back to your side while in flat bed without using bedrails?  3  -LM 3  -AM    Moving from lying on back to sitting on the side of a flat bed without bedrails?  3  -LM 3  -AM    Moving to and from a bed to a chair (including a wheelchair)?  3  -LM 3  -AM    Standing up from a chair using your arms (e.g., wheelchair, bedside chair)?  1  -LM 1  -AM    Climbing 3-5 steps with a railing?  1 
 -LM 1  -AM    To walk in hospital room?  1  -LM 1  -AM    AM-PAC 6 Clicks Score  12  -LM 12  -AM    How much help from another is currently needed...    Putting on and taking off regular lower body clothing? 1  -BH (r) SP (t) BH (c)      Bathing (including washing, rinsing, and drying) 2  -BH (r) SP (t) BH (c)      Toileting (which includes using toilet bed pan or urinal) 2  -BH (r) SP (t) BH (c)      Putting on and taking off regular upper body clothing 3  -BH (r) SP (t) BH (c)      Taking care of personal grooming (such as brushing teeth) 3  -BH (r) SP (t) BH (c)      Eating meals 4  -BH (r) SP (t) BH (c)      Score 15  -BH (r) SP (t)      Functional Assessment    Outcome Measure Options AM-PAC 6 Clicks Daily Activity (OT)  -BH (r) SP (t) BH (c) AM-PAC 6 Clicks Basic Mobility (PT)  -LM AM-PAC 6 Clicks Basic Mobility (PT)  -AM      09/11/17 0921          How much help from another is currently needed...    Putting on and taking off regular lower body clothing? 2  -RB      Bathing (including washing, rinsing, and drying) 2  -RB      Toileting (which includes using toilet bed pan or urinal) 2  -RB      Putting on and taking off regular upper body clothing 3  -RB      Taking care of personal grooming (such as brushing teeth) 3  -RB      Eating meals 4  -RB      Score 16  -RB      Functional Assessment    Outcome Measure Options AM-PAC 6 Clicks Daily Activity (OT)  -RB        User Key  (r) = Recorded By, (t) = Taken By, (c) = Cosigned By    Initials Name Provider Type    LM Isela Chris, PT Physical Therapist    RB Axel Perez, OT Occupational Therapist    BH Isabel Jones, OTR/L Occupational Therapist    HOLLIS Juan, PTA Physical Therapy Assistant    SUSIE Young, PTA Physical Therapy Assistant    JOHN Malhotra, PTA Physical Therapy Assistant    DANY Juárez, OT Student OT Student           Time Calculation:         PT Charges       09/13/17 8470 09/13/17 1142       Time Calculation    Start 
Time 1440  -RW 1012  -RW     Stop Time 1520  -RW 1110  -RW     Time Calculation (min) 40 min  -RW 58 min  -RW     Time Calculation- PT    Total Timed Code Minutes- PT 40 minute(s)  -RW 58 minute(s)  -RW       User Key  (r) = Recorded By, (t) = Taken By, (c) = Cosigned By    Initials Name Provider Type    JOHN Malhotra PTA Physical Therapy Assistant          Therapy Charges for Today     Code Description Service Date Service Provider Modifiers Qty    34927560269 HC PT THER PROC EA 15 MIN 9/13/2017 Balaji Malhotra PTA GP 1    15726328939 HC PT THERAPEUTIC ACT EA 15 MIN 9/13/2017 Balaji Malhotra PTA GP 3    45991538511 HC PT THER PROC EA 15 MIN 9/13/2017 Balaji Malhotra PTA GP 2    10872123945 HC PT THERAPEUTIC ACT EA 15 MIN 9/13/2017 Balaji Malhotra PTA GP 1          PT G-Codes  PT Professional Judgement Used?: Yes  Outcome Measure Options: AM-PAC 6 Clicks Basic Mobility (PT)  Score: 12  Functional Limitation: Mobility: Walking and moving around  Mobility: Walking and Moving Around Current Status (): At least 60 percent but less than 80 percent impaired, limited or restricted  Mobility: Walking and Moving Around Goal Status (): At least 20 percent but less than 40 percent impaired, limited or restricted    Balaji Malhotra PTA  9/13/2017

## 2024-08-15 ENCOUNTER — OFFICE VISIT (OUTPATIENT)
Facility: CLINIC | Age: 44
End: 2024-08-15
Payer: MEDICAID

## 2024-08-15 VITALS
DIASTOLIC BLOOD PRESSURE: 84 MMHG | HEIGHT: 71 IN | RESPIRATION RATE: 14 BRPM | SYSTOLIC BLOOD PRESSURE: 124 MMHG | HEART RATE: 99 BPM | WEIGHT: 250.8 LBS | BODY MASS INDEX: 35.11 KG/M2 | TEMPERATURE: 97 F | OXYGEN SATURATION: 98 %

## 2024-08-15 DIAGNOSIS — I10 ESSENTIAL HYPERTENSION: ICD-10-CM

## 2024-08-15 DIAGNOSIS — E78.5 HYPERLIPIDEMIA, UNSPECIFIED HYPERLIPIDEMIA TYPE: ICD-10-CM

## 2024-08-15 DIAGNOSIS — E66.9 OBESITY (BMI 30.0-34.9): ICD-10-CM

## 2024-08-15 DIAGNOSIS — E11.9 TYPE 2 DIABETES MELLITUS WITHOUT COMPLICATION, WITHOUT LONG-TERM CURRENT USE OF INSULIN (HCC): Primary | ICD-10-CM

## 2024-08-15 DIAGNOSIS — M54.42 CHRONIC BILATERAL LOW BACK PAIN WITH LEFT-SIDED SCIATICA: ICD-10-CM

## 2024-08-15 DIAGNOSIS — G89.29 CHRONIC BILATERAL LOW BACK PAIN WITH LEFT-SIDED SCIATICA: ICD-10-CM

## 2024-08-15 DIAGNOSIS — R10.9 RIGHT SIDED ABDOMINAL PAIN: ICD-10-CM

## 2024-08-15 DIAGNOSIS — E55.9 VITAMIN D DEFICIENCY: ICD-10-CM

## 2024-08-15 PROCEDURE — 3052F HG A1C>EQUAL 8.0%<EQUAL 9.0%: CPT | Performed by: FAMILY MEDICINE

## 2024-08-15 PROCEDURE — 99214 OFFICE O/P EST MOD 30 MIN: CPT | Performed by: FAMILY MEDICINE

## 2024-08-15 PROCEDURE — 3074F SYST BP LT 130 MM HG: CPT | Performed by: FAMILY MEDICINE

## 2024-08-15 PROCEDURE — 3079F DIAST BP 80-89 MM HG: CPT | Performed by: FAMILY MEDICINE

## 2024-08-15 RX ORDER — CHOLECALCIFEROL (VITAMIN D3) 1250 MCG
CAPSULE ORAL
Qty: 12 CAPSULE | Refills: 1 | Status: SHIPPED | OUTPATIENT
Start: 2024-08-15

## 2024-08-15 RX ORDER — IBUPROFEN 800 MG/1
800 TABLET ORAL 2 TIMES DAILY PRN
Qty: 180 TABLET | Refills: 1 | Status: SHIPPED | OUTPATIENT
Start: 2024-08-15

## 2024-08-15 RX ORDER — ATORVASTATIN CALCIUM 10 MG/1
10 TABLET, FILM COATED ORAL DAILY
Qty: 90 TABLET | Refills: 1 | Status: SHIPPED | OUTPATIENT
Start: 2024-08-15

## 2024-08-15 SDOH — ECONOMIC STABILITY: FOOD INSECURITY: WITHIN THE PAST 12 MONTHS, YOU WORRIED THAT YOUR FOOD WOULD RUN OUT BEFORE YOU GOT MONEY TO BUY MORE.: NEVER TRUE

## 2024-08-15 SDOH — ECONOMIC STABILITY: FOOD INSECURITY: WITHIN THE PAST 12 MONTHS, THE FOOD YOU BOUGHT JUST DIDN'T LAST AND YOU DIDN'T HAVE MONEY TO GET MORE.: NEVER TRUE

## 2024-08-15 SDOH — ECONOMIC STABILITY: INCOME INSECURITY: HOW HARD IS IT FOR YOU TO PAY FOR THE VERY BASICS LIKE FOOD, HOUSING, MEDICAL CARE, AND HEATING?: NOT HARD AT ALL

## 2024-08-15 ASSESSMENT — PATIENT HEALTH QUESTIONNAIRE - PHQ9
SUM OF ALL RESPONSES TO PHQ QUESTIONS 1-9: 0
1. LITTLE INTEREST OR PLEASURE IN DOING THINGS: NOT AT ALL
2. FEELING DOWN, DEPRESSED OR HOPELESS: NOT AT ALL
SUM OF ALL RESPONSES TO PHQ QUESTIONS 1-9: 0
SUM OF ALL RESPONSES TO PHQ9 QUESTIONS 1 & 2: 0

## 2024-08-15 ASSESSMENT — ENCOUNTER SYMPTOMS
SHORTNESS OF BREATH: 1
BACK PAIN: 1
ABDOMINAL PAIN: 1

## 2024-08-15 NOTE — PROGRESS NOTES
Danielle Barraza (:  1980) is a 44 y.o. female,Established patient, here for evaluation of the following chief complaint(s):  No chief complaint on file.         Assessment & Plan  Type 2 diabetes mellitus without complication, without long-term current use of insulin (HCC)     Improving.  Last A1c was 8.1 in May 2024 decreased from 9.5.  Continue on glipizide XL 10 mg once daily, Lantus 25 to 30 units a day.  (Note: Patient at one point was taking Trulicity, and was doing well.  The medication was placed on back order so patient had to resort to insulin).  Will reconsider starting Trulicity again pending patient A1c today.  Her glipizide may have to be discontinued altogether if Trulicity or any other GLP-1 agonist is started.  Orders:    atorvastatin (LIPITOR) 10 MG tablet; Take 1 tablet by mouth daily    Lipid Panel    Comprehensive Metabolic Panel    Hemoglobin A1C    Hyperlipidemia, unspecified hyperlipidemia type     Was not taking atorvastatin 10 mg a day.  Patient notes that she was not aware that she was restarted on atorvastatin.  New prescription was re-sent to her pharmacy.    Orders:    atorvastatin (LIPITOR) 10 MG tablet; Take 1 tablet by mouth daily    Lipid Panel    Comprehensive Metabolic Panel    Right sided abdominal pain     Unsure what is the cause of pain.  Orders:    Comprehensive Metabolic Panel; Future    CT ABDOMEN PELVIS W WO CONTRAST Additional Contrast? Radiologist Recommendation; Future    Comprehensive Metabolic Panel    Vitamin D deficiency  Well-controlled, continue current medications and continue current treatment plan  Orders:    Cholecalciferol (VITAMIN D3) 1.25 MG (38554 UT) CAPS; 1 tablet by mouth once weekly    Chronic bilateral low back pain with left-sided sciatica     Fair Control  Orders:    ibuprofen (ADVIL;MOTRIN) 800 MG tablet; Take 1 tablet by mouth 2 times daily as needed for Pain    Essential hypertension  Well-controlled, continue current medications,

## 2024-08-16 LAB
A/G RATIO: 1.6 RATIO (ref 1.1–2.6)
ALBUMIN: 4.1 G/DL (ref 3.5–5)
ALP BLD-CCNC: 41 U/L (ref 25–115)
ALT SERPL-CCNC: 16 U/L (ref 5–40)
ANION GAP SERPL CALCULATED.3IONS-SCNC: 10 MMOL/L (ref 3–15)
AST SERPL-CCNC: 11 U/L (ref 10–37)
BILIRUB SERPL-MCNC: 0.6 MG/DL (ref 0.2–1.2)
BUN BLDV-MCNC: 11 MG/DL (ref 6–22)
CALCIUM SERPL-MCNC: 9.4 MG/DL (ref 8.4–10.5)
CHLORIDE BLD-SCNC: 100 MMOL/L (ref 98–110)
CHOLESTEROL, TOTAL: 146 MG/DL (ref 110–200)
CHOLESTEROL/HDL RATIO: 3.7 (ref 0–5)
CO2: 28 MMOL/L (ref 20–32)
CREAT SERPL-MCNC: 0.7 MG/DL (ref 0.5–1.2)
ESTIMATED AVERAGE GLUCOSE: 190 MG/DL (ref 91–123)
GFR, ESTIMATED: >60 ML/MIN/1.73 SQ.M.
GLOBULIN: 2.6 G/DL (ref 2–4)
GLUCOSE: 192 MG/DL (ref 70–99)
HBA1C MFR BLD: 8.3 % (ref 4.8–5.6)
HDLC SERPL-MCNC: 40 MG/DL
LDL CHOLESTEROL: 62 MG/DL (ref 50–99)
LDL/HDL RATIO: 1.6
NON-HDL CHOLESTEROL: 106 MG/DL
POTASSIUM SERPL-SCNC: 3.7 MMOL/L (ref 3.5–5.5)
SODIUM BLD-SCNC: 138 MMOL/L (ref 133–145)
TOTAL PROTEIN: 6.7 G/DL (ref 6.4–8.3)
TRIGL SERPL-MCNC: 220 MG/DL (ref 40–149)
VLDLC SERPL CALC-MCNC: 44 MG/DL (ref 8–30)

## 2024-08-19 RX ORDER — DULAGLUTIDE 0.75 MG/.5ML
0.75 INJECTION, SOLUTION SUBCUTANEOUS WEEKLY
Qty: 2 ML | Refills: 2 | Status: SHIPPED | OUTPATIENT
Start: 2024-08-19

## 2024-08-19 NOTE — TELEPHONE ENCOUNTER
Patient is requesting refill on       TRULICITY 0.75 MG/0.5ML SOPN [[id number]]    Order Details  Dose, Route, Frequency: As Directed   Dispense Quantity: -- Refills: --          Sig: INJECT 0.75 MG SUBCUTANEOUSLY ONCE A WEEK   Patient not taking: Reported on 5/8/2024     Future Appointments   Date Time Provider Department Center   6/9/2025  1:15 PM [facility] 1 [facility]

## 2024-08-20 NOTE — TELEPHONE ENCOUNTER
Orders Placed This Encounter    TRULICITY 0.75 MG/0.5ML SOPN SC injection     Sig: Inject 0.5 mLs into the skin once a week INJECT 0.75 MG SUBCUTANEOUSLY ONCE A WEEK     Dispense:  2 mL     Refill:  2

## 2024-09-05 ENCOUNTER — TELEPHONE (OUTPATIENT)
Facility: CLINIC | Age: 44
End: 2024-09-05

## 2024-09-05 NOTE — TELEPHONE ENCOUNTER
Pat with [facility] is calling to let you know the patient's Ins has denied the CT, they are requesting a peer to peer.  Patient is schedule to come in this Sat 9/7/24    Peer to peer   [facility] - [phone]  Tracking # [id number]

## 2024-11-10 ENCOUNTER — PATIENT MESSAGE (OUTPATIENT)
Facility: CLINIC | Age: 44
End: 2024-11-10

## 2024-11-10 DIAGNOSIS — Z76.0 MEDICATION REFILL: ICD-10-CM

## 2024-11-10 DIAGNOSIS — E11.9 TYPE 2 DIABETES MELLITUS WITHOUT COMPLICATION, WITHOUT LONG-TERM CURRENT USE OF INSULIN (HCC): Primary | ICD-10-CM

## 2024-11-11 ENCOUNTER — PATIENT MESSAGE (OUTPATIENT)
Facility: CLINIC | Age: 44
End: 2024-11-11

## 2024-11-11 DIAGNOSIS — G89.29 CHRONIC BILATERAL LOW BACK PAIN WITH LEFT-SIDED SCIATICA: ICD-10-CM

## 2024-11-11 DIAGNOSIS — D50.9 IRON DEFICIENCY ANEMIA, UNSPECIFIED IRON DEFICIENCY ANEMIA TYPE: ICD-10-CM

## 2024-11-11 DIAGNOSIS — E55.9 VITAMIN D DEFICIENCY: ICD-10-CM

## 2024-11-11 DIAGNOSIS — M54.42 CHRONIC BILATERAL LOW BACK PAIN WITH LEFT-SIDED SCIATICA: ICD-10-CM

## 2024-11-11 NOTE — TELEPHONE ENCOUNTER
You have to place a new order for Trulicity and patient is requesting Triamcinolone ointment.     Last Appointment:  8/15/2024  Future Appointments   Date Time Provider Department Center   1/6/2025  3:15 PM Honey Rueda MD [facility]   6/9/2025  1:15 PM [facility]

## 2024-11-12 RX ORDER — FERROUS SULFATE 325(65) MG
1 TABLET ORAL 2 TIMES DAILY
Qty: 180 TABLET | Refills: 1 | Status: SHIPPED | OUTPATIENT
Start: 2024-11-12

## 2024-11-12 RX ORDER — IBUPROFEN 800 MG/1
800 TABLET, FILM COATED ORAL 2 TIMES DAILY PRN
Qty: 180 TABLET | Refills: 1 | Status: SHIPPED | OUTPATIENT
Start: 2024-11-12

## 2024-11-12 RX ORDER — TRIAMCINOLONE ACETONIDE 1 MG/G
CREAM TOPICAL
Qty: 45 G | Refills: 1 | Status: SHIPPED | OUTPATIENT
Start: 2024-11-12

## 2024-11-12 RX ORDER — CHOLECALCIFEROL (VITAMIN D3) 1250 MCG
CAPSULE ORAL
Qty: 12 CAPSULE | Refills: 1 | Status: SHIPPED | OUTPATIENT
Start: 2024-11-12

## 2024-11-12 NOTE — TELEPHONE ENCOUNTER
Orders Placed This Encounter    triamcinolone (KENALOG) 0.1 % cream     Sig: Apply topically 2 times daily.     Dispense:  45 g     Refill:  1    dulaglutide (TRULICITY) 0.75 MG/0.5ML SOAJ SC injection     Sig: Inject 0.5 mLs into the skin once a week     Dispense:  4 Adjustable Dose Pre-filled Pen Syringe     Refill:  1

## 2024-11-12 NOTE — TELEPHONE ENCOUNTER
Last Appointment:  8/15/2024  Future Appointments   Date Time Provider Department Center   1/6/2025  3:15 PM Honey Rueda MD [facility]   6/9/2025  1:15 PM [facility] RM 1 [facility]

## 2024-12-03 ENCOUNTER — OFFICE VISIT (OUTPATIENT)
Facility: CLINIC | Age: 44
End: 2024-12-03
Payer: MEDICAID

## 2024-12-03 VITALS
DIASTOLIC BLOOD PRESSURE: 82 MMHG | HEART RATE: 95 BPM | HEIGHT: 71 IN | SYSTOLIC BLOOD PRESSURE: 125 MMHG | BODY MASS INDEX: 34.22 KG/M2 | RESPIRATION RATE: 17 BRPM | WEIGHT: 244.4 LBS | OXYGEN SATURATION: 98 % | TEMPERATURE: 97 F

## 2024-12-03 DIAGNOSIS — G62.9 NEUROPATHY: ICD-10-CM

## 2024-12-03 DIAGNOSIS — M79.604 RIGHT LEG PAIN: Primary | ICD-10-CM

## 2024-12-03 DIAGNOSIS — R53.83 FATIGUE, UNSPECIFIED TYPE: ICD-10-CM

## 2024-12-03 PROCEDURE — 99214 OFFICE O/P EST MOD 30 MIN: CPT | Performed by: FAMILY MEDICINE

## 2024-12-03 ASSESSMENT — PATIENT HEALTH QUESTIONNAIRE - PHQ9
SUM OF ALL RESPONSES TO PHQ QUESTIONS 1-9: 0
SUM OF ALL RESPONSES TO PHQ QUESTIONS 1-9: 0
SUM OF ALL RESPONSES TO PHQ9 QUESTIONS 1 & 2: 0
SUM OF ALL RESPONSES TO PHQ QUESTIONS 1-9: 0
SUM OF ALL RESPONSES TO PHQ QUESTIONS 1-9: 0
2. FEELING DOWN, DEPRESSED OR HOPELESS: NOT AT ALL
1. LITTLE INTEREST OR PLEASURE IN DOING THINGS: NOT AT ALL

## 2024-12-03 ASSESSMENT — ENCOUNTER SYMPTOMS
ABDOMINAL PAIN: 0
SHORTNESS OF BREATH: 1
BACK PAIN: 1

## 2024-12-03 NOTE — PROGRESS NOTES
\"Have you been to the ER, urgent care clinic since your last visit?  Hospitalized since your last visit?\"    YES - When: approximately 2  weeks ago.  Where and Why: [facility].    “Have you seen or consulted any other health care providers outside our system since your last visit?”    NO      “Have you had a diabetic eye exam?”    NO     No diabetic eye exam on file           
minutes reviewing previous notes, test results and face to face with the patient discussing the diagnosis and importance of compliance with the treatment plan as well as documenting on the day of the visit.      An electronic signature was used to authenticate this note.    --Honey Rueda MD

## 2024-12-04 LAB
A/G RATIO: 1.5 RATIO (ref 1.1–2.6)
ALBUMIN: 4.3 G/DL (ref 3.5–5)
ALP BLD-CCNC: 50 U/L (ref 25–115)
ALT SERPL-CCNC: 26 U/L (ref 5–40)
ANION GAP SERPL CALCULATED.3IONS-SCNC: 9 MMOL/L (ref 3–15)
AST SERPL-CCNC: 12 U/L (ref 10–37)
BASOPHILS ABSOLUTE: 0.1 K/UL (ref 0–0.2)
BASOPHILS RELATIVE PERCENT: 1 % (ref 0–2)
BILIRUB SERPL-MCNC: 1.2 MG/DL (ref 0.2–1.2)
BUN BLDV-MCNC: 9 MG/DL (ref 6–22)
CALCIUM SERPL-MCNC: 10.3 MG/DL (ref 8.4–10.5)
CHLORIDE BLD-SCNC: 99 MMOL/L (ref 98–110)
CO2: 29 MMOL/L (ref 20–32)
CREAT SERPL-MCNC: 0.7 MG/DL (ref 0.5–1.2)
EOSINOPHIL # BLD: 1 % (ref 0–6)
EOSINOPHILS ABSOLUTE: 0.1 K/UL (ref 0–0.5)
GFR, ESTIMATED: >60 ML/MIN/1.73 SQ.M.
GLOBULIN: 2.8 G/DL (ref 2–4)
GLUCOSE: 210 MG/DL (ref 70–99)
HCT VFR BLD CALC: 38.5 % (ref 35.1–46.5)
HEMOGLOBIN: 13.1 G/DL (ref 11.7–15.5)
LYMPHOCYTES # BLD: 33 % (ref 20–45)
LYMPHOCYTES ABSOLUTE: 2.3 K/UL (ref 1–4.8)
MAGNESIUM: 1.5 MG/DL (ref 1.6–2.5)
MCH RBC QN AUTO: 29 PG (ref 26–34)
MCHC RBC AUTO-ENTMCNC: 34 G/DL (ref 31–36)
MCV RBC AUTO: 85 FL (ref 80–99)
MONOCYTES ABSOLUTE: 0.4 K/UL (ref 0.1–1)
MONOCYTES: 5 % (ref 3–12)
NEUTROPHILS ABSOLUTE: 4.1 K/UL (ref 1.8–7.7)
NEUTROPHILS: 59 % (ref 40–75)
PDW BLD-RTO: 13 % (ref 10–15.5)
PLATELET # BLD: 324 K/UL (ref 140–440)
PMV BLD AUTO: 10.9 FL (ref 9–13)
POTASSIUM SERPL-SCNC: 3.9 MMOL/L (ref 3.5–5.5)
RBC # BLD: 4.51 M/UL (ref 3.8–5.2)
SODIUM BLD-SCNC: 137 MMOL/L (ref 133–145)
T4 FREE: 1.3 NG/DL (ref 0.9–1.8)
TOTAL PROTEIN: 7.1 G/DL (ref 6.4–8.3)
TSH SERPL DL<=0.05 MIU/L-ACNC: 1.11 MCU/ML (ref 0.27–4.2)
VITAMIN B-12: 485 PG/ML (ref 211–911)
WBC # BLD: 6.9 K/UL (ref 4–11)

## 2024-12-15 ENCOUNTER — PATIENT MESSAGE (OUTPATIENT)
Facility: CLINIC | Age: 44
End: 2024-12-15

## 2024-12-15 DIAGNOSIS — R79.0 LOW MAGNESIUM LEVEL: Primary | ICD-10-CM

## 2024-12-15 DIAGNOSIS — I10 ESSENTIAL HYPERTENSION: Primary | ICD-10-CM

## 2024-12-16 RX ORDER — LISINOPRIL AND HYDROCHLOROTHIAZIDE 10; 12.5 MG/1; MG/1
1 TABLET ORAL DAILY
Qty: 90 TABLET | Refills: 1 | Status: SHIPPED | OUTPATIENT
Start: 2024-12-16

## 2024-12-16 NOTE — TELEPHONE ENCOUNTER
Last Appointment:  12/3/2024  Future Appointments   Date Time Provider Department Center   6/9/2025  1:15 PM [facility] 1 [facility]

## 2024-12-17 RX ORDER — CALCIUM CARBONATE 260MG(650)
TABLET,CHEWABLE ORAL
Qty: 90 TABLET | Refills: 1 | Status: SHIPPED | OUTPATIENT
Start: 2024-12-17

## 2024-12-17 NOTE — TELEPHONE ENCOUNTER
Orders Placed This Encounter    Magnesium Citrate 100 MG TABS     Si tablet by mouth once daily     Dispense:  90 tablet     Refill:  1

## 2025-01-01 DIAGNOSIS — Z76.0 MEDICATION REFILL: ICD-10-CM

## 2025-01-01 DIAGNOSIS — E11.9 TYPE 2 DIABETES MELLITUS WITHOUT COMPLICATION, WITHOUT LONG-TERM CURRENT USE OF INSULIN (HCC): ICD-10-CM

## 2025-01-02 RX ORDER — DULAGLUTIDE 0.75 MG/.5ML
INJECTION, SOLUTION SUBCUTANEOUS
Qty: 4 ML | Refills: 3 | Status: SHIPPED | OUTPATIENT
Start: 2025-01-02

## 2025-01-03 NOTE — TELEPHONE ENCOUNTER
Orders Placed This Encounter    dulaglutide (TRULICITY) 0.75 MG/0.5ML SOAJ SC injection     Sig: INJECT 1/2 (ONE-HALF) ML SUBCUTANEOUSLY  ONCE A WEEK     Dispense:  4 mL     Refill:  3

## 2025-01-06 DIAGNOSIS — Z76.0 MEDICATION REFILL: ICD-10-CM

## 2025-01-06 RX ORDER — TRIAMCINOLONE ACETONIDE 1 MG/G
CREAM TOPICAL
Qty: 45 G | Refills: 1 | Status: CANCELLED | OUTPATIENT
Start: 2025-01-06

## 2025-01-07 NOTE — TELEPHONE ENCOUNTER
Patient wants ointment instead of cream.     Last Appointment:  Visit date not found  Future Appointments   Date Time Provider Department Center   6/9/2025  1:15 PM [facility] RM 1 [facility]

## 2025-01-09 RX ORDER — TRIAMCINOLONE ACETONIDE 1 MG/G
OINTMENT TOPICAL
Qty: 60 G | Refills: 1 | Status: SHIPPED | OUTPATIENT
Start: 2025-01-09

## 2025-01-09 NOTE — TELEPHONE ENCOUNTER
Orders Placed This Encounter    triamcinolone (KENALOG) 0.1 % ointment     Sig: Apply topically 2 times daily to area of concern     Dispense:  60 g     Refill:  1

## 2025-01-10 LAB — PAP SMEAR, EXTERNAL: NORMAL

## 2025-01-24 ENCOUNTER — OFFICE VISIT (OUTPATIENT)
Facility: CLINIC | Age: 45
End: 2025-01-24
Payer: MEDICAID

## 2025-01-24 VITALS
HEART RATE: 81 BPM | OXYGEN SATURATION: 96 % | SYSTOLIC BLOOD PRESSURE: 112 MMHG | HEIGHT: 71 IN | RESPIRATION RATE: 16 BRPM | TEMPERATURE: 97.3 F | WEIGHT: 241.8 LBS | DIASTOLIC BLOOD PRESSURE: 76 MMHG | BODY MASS INDEX: 33.85 KG/M2

## 2025-01-24 DIAGNOSIS — E66.811 CLASS 1 OBESITY DUE TO EXCESS CALORIES WITH SERIOUS COMORBIDITY AND BODY MASS INDEX (BMI) OF 33.0 TO 33.9 IN ADULT: ICD-10-CM

## 2025-01-24 DIAGNOSIS — I10 ESSENTIAL HYPERTENSION: ICD-10-CM

## 2025-01-24 DIAGNOSIS — E78.5 HYPERLIPIDEMIA, UNSPECIFIED HYPERLIPIDEMIA TYPE: ICD-10-CM

## 2025-01-24 DIAGNOSIS — B35.1 ONYCHOMYCOSIS: Primary | ICD-10-CM

## 2025-01-24 DIAGNOSIS — E66.09 CLASS 1 OBESITY DUE TO EXCESS CALORIES WITH SERIOUS COMORBIDITY AND BODY MASS INDEX (BMI) OF 33.0 TO 33.9 IN ADULT: ICD-10-CM

## 2025-01-24 DIAGNOSIS — E11.9 TYPE 2 DIABETES MELLITUS WITHOUT COMPLICATION, WITHOUT LONG-TERM CURRENT USE OF INSULIN (HCC): ICD-10-CM

## 2025-01-24 PROCEDURE — 3074F SYST BP LT 130 MM HG: CPT | Performed by: FAMILY MEDICINE

## 2025-01-24 PROCEDURE — 99213 OFFICE O/P EST LOW 20 MIN: CPT | Performed by: FAMILY MEDICINE

## 2025-01-24 PROCEDURE — 3078F DIAST BP <80 MM HG: CPT | Performed by: FAMILY MEDICINE

## 2025-01-24 RX ORDER — DOXYCYCLINE 100 MG/1
CAPSULE ORAL
COMMUNITY
Start: 2025-01-17

## 2025-01-24 RX ORDER — TERBINAFINE HYDROCHLORIDE 250 MG/1
250 TABLET ORAL DAILY
Qty: 84 TABLET | Refills: 0 | Status: SHIPPED | OUTPATIENT
Start: 2025-01-24 | End: 2025-04-18

## 2025-01-24 RX ORDER — GLIPIZIDE 10 MG/1
10 TABLET, FILM COATED, EXTENDED RELEASE ORAL DAILY
Qty: 90 TABLET | Refills: 1 | Status: SHIPPED | OUTPATIENT
Start: 2025-01-24

## 2025-01-24 ASSESSMENT — PATIENT HEALTH QUESTIONNAIRE - PHQ9
2. FEELING DOWN, DEPRESSED OR HOPELESS: NOT AT ALL
SUM OF ALL RESPONSES TO PHQ QUESTIONS 1-9: 0
SUM OF ALL RESPONSES TO PHQ9 QUESTIONS 1 & 2: 0
SUM OF ALL RESPONSES TO PHQ QUESTIONS 1-9: 0
SUM OF ALL RESPONSES TO PHQ QUESTIONS 1-9: 0
1. LITTLE INTEREST OR PLEASURE IN DOING THINGS: NOT AT ALL
SUM OF ALL RESPONSES TO PHQ QUESTIONS 1-9: 0

## 2025-01-24 ASSESSMENT — ENCOUNTER SYMPTOMS
SHORTNESS OF BREATH: 1
BACK PAIN: 1
ABDOMINAL PAIN: 0

## 2025-01-24 NOTE — PROGRESS NOTES
Social History:   She  reports that she has never smoked. She has never used smokeless tobacco.  She  reports no history of alcohol use.        Review of Systems   Constitutional:  Negative for chills and fever.   Eyes:  Negative for visual disturbance.   Respiratory:  Positive for shortness of breath (on occasions).    Cardiovascular:  Positive for palpitations (on occasions, usually associated with SOB.   Not today). Negative for chest pain and leg swelling.   Gastrointestinal:  Negative for abdominal pain (on occasions, not today.).   Genitourinary:  Negative for difficulty urinating.   Musculoskeletal:  Positive for back pain.   Neurological:  Positive for headaches (occasional, not today). Negative for dizziness and numbness.          Objective  /76 (Site: Right Upper Arm, Position: Sitting)   Pulse 81   Temp 97.3 °F (36.3 °C) (Temporal)   Resp 16   Ht 1.803 m (5' 11\")   Wt 109.7 kg (241 lb 12.8 oz)   SpO2 96%   BMI 33.72 kg/m²     Physical Exam  Vitals and nursing note reviewed.   Constitutional:       Appearance: Normal appearance. She is obese.   HENT:      Head: Normocephalic and atraumatic.   Eyes:      Extraocular Movements: Extraocular movements intact.   Cardiovascular:      Rate and Rhythm: Normal rate and regular rhythm.   Pulmonary:      Effort: Pulmonary effort is normal.      Breath sounds: Normal breath sounds.   Abdominal:      General: Abdomen is flat.      Palpations: Abdomen is soft.      Hernia: No hernia is present.      Comments:      Musculoskeletal:      Right lower leg: No edema.      Left lower leg: No edema.   Skin:     General: Skin is warm and dry.   Neurological:      General: No focal deficit present.      Mental Status: She is alert and oriented to person, place, and time.            On this date 8/15/2024 I have spent 34 minutes reviewing previous notes, test results and face to face with the patient discussing the diagnosis and importance of compliance with

## 2025-01-25 NOTE — ASSESSMENT & PLAN NOTE
-Triglycerides elevated.   -(8/15/2024) Last triglyceride was 220 , LDL  chol 62 mg/dL   -Taking atorvastatin 10 mg a day.

## 2025-01-25 NOTE — ASSESSMENT & PLAN NOTE
-Well-controlled, continue current medications, continue current treatment plan, medication adherence emphasized, and lifestyle modifications recommended  -Taking amlodipine 2.5 mg once daily, lisinopril hydrochlorothiazide 10-12.5 mg once daily

## 2025-01-25 NOTE — ASSESSMENT & PLAN NOTE
-Recommend a low calorie diet  -Portion control  -Patient encouraged to exercise for 30 minutes 3 to 5 times a week.  -Recommend eating a well balanced healthy diet. (Consistent of lean meats, lots of fruits, vegetables and whole grains).    -Limit processed foods.   -Drink 32 to 64 ounces of fluid each day  -Eat 2 to 3 g of fiber each day

## 2025-01-25 NOTE — ASSESSMENT & PLAN NOTE
Start terbinafine 250 mg once daily.   Check liver enzymes once medication completed  Orders:   START terbinafine (LAMISIL) 250 MG tablet; Take 1 tablet by mouth daily

## 2025-01-25 NOTE — ASSESSMENT & PLAN NOTE
-Uncontrolled.  Patient admits to not taking her glipizide nor her insulin at all for the past month or so.  Only taking Trulicity 0.75 mg weekly.  Has been sluggish and extremely fatigued.  -POC A1c today was 10.3.  This is increased from previous A1c of 8.3.  Patient advised to take both her glipizide and Trulicity.  She is to continue to monitor sugars closely.  If blood sugars not within normal limits in 1 month, then increase Trulicity to the next highest dose.  Blood sugars will continue to be monitored and adjusted until Trulicity is maxed out as long as medication is tolerated by patient.  -Patient currently not taking any insulin  Orders:   RESTART glipiZIDE (GLIPIZIDE XL) 10 MG extended release tablet; Take 1 tablet by mouth daily    AMB POC HEMOGLOBIN A1C

## 2025-01-27 ENCOUNTER — PATIENT MESSAGE (OUTPATIENT)
Facility: CLINIC | Age: 45
End: 2025-01-27

## 2025-01-27 DIAGNOSIS — E11.65 UNCONTROLLED TYPE 2 DIABETES MELLITUS WITH HYPERGLYCEMIA (HCC): ICD-10-CM

## 2025-01-27 DIAGNOSIS — B35.1 ONYCHOMYCOSIS: Primary | ICD-10-CM

## 2025-01-27 NOTE — TELEPHONE ENCOUNTER
Last Appointment:  1/24/2025  Future Appointments   Date Time Provider Department Center   6/9/2025  1:15 PM [facility] 1 [facility]

## 2025-01-28 ENCOUNTER — TELEPHONE (OUTPATIENT)
Facility: CLINIC | Age: 45
End: 2025-01-28

## 2025-01-28 RX ORDER — MAGNESIUM OXIDE 400 MG/1
400 TABLET ORAL DAILY
Qty: 90 TABLET | Refills: 0 | Status: SHIPPED | OUTPATIENT
Start: 2025-01-28

## 2025-01-29 RX ORDER — INSULIN GLARGINE 100 [IU]/ML
INJECTION, SOLUTION SUBCUTANEOUS
Qty: 5 ADJUSTABLE DOSE PRE-FILLED PEN SYRINGE | Refills: 2 | Status: SHIPPED | OUTPATIENT
Start: 2025-01-29

## 2025-01-29 RX ORDER — LANCETS 28 GAUGE
EACH MISCELLANEOUS
Qty: 100 EACH | Refills: 5 | Status: SHIPPED | OUTPATIENT
Start: 2025-01-29

## 2025-01-29 RX ORDER — LANCETS 28 GAUGE
EACH MISCELLANEOUS
Qty: 100 EACH | Refills: 5 | Status: SHIPPED | OUTPATIENT
Start: 2025-01-29 | End: 2025-01-29 | Stop reason: SDUPTHER

## 2025-01-29 RX ORDER — TERBINAFINE HYDROCHLORIDE 250 MG/1
250 TABLET ORAL DAILY
Qty: 84 TABLET | Refills: 0 | Status: SHIPPED | OUTPATIENT
Start: 2025-01-29 | End: 2025-04-23

## 2025-01-29 RX ORDER — BLOOD-GLUCOSE METER
KIT MISCELLANEOUS
Qty: 1 KIT | Refills: 0 | Status: SHIPPED | OUTPATIENT
Start: 2025-01-29

## 2025-01-29 RX ORDER — BLOOD-GLUCOSE METER
KIT MISCELLANEOUS
Qty: 100 EACH | Refills: 3 | Status: SHIPPED | OUTPATIENT
Start: 2025-01-29 | End: 2025-01-29 | Stop reason: SDUPTHER

## 2025-01-29 RX ORDER — BLOOD-GLUCOSE METER
KIT MISCELLANEOUS
Qty: 100 EACH | Refills: 5 | Status: SHIPPED | OUTPATIENT
Start: 2025-01-29

## 2025-01-29 RX ORDER — BLOOD-GLUCOSE METER
KIT MISCELLANEOUS
Qty: 1 KIT | Refills: 0 | Status: SHIPPED | OUTPATIENT
Start: 2025-01-29 | End: 2025-01-29 | Stop reason: SDUPTHER

## 2025-01-29 NOTE — TELEPHONE ENCOUNTER
Orders Placed This Encounter    magnesium oxide (MAG-OX) 400 MG tablet     Sig: Take 1 tablet by mouth daily     Dispense:  90 tablet     Refill:  0     Sent because mag citrate was not available per pharmacy.     Please inform patient

## 2025-01-29 NOTE — TELEPHONE ENCOUNTER
Orders Placed This Encounter    insulin glargine (LANTUS SOLOSTAR) 100 UNIT/ML injection pen     Sig: Inject 25 units subcutaneously once a day.     Dispense:  5 Adjustable Dose Pre-filled Pen Syringe     Refill:  2

## 2025-01-29 NOTE — TELEPHONE ENCOUNTER
Orders Placed This Encounter    terbinafine (LAMISIL) 250 MG tablet     Sig: Take 1 tablet by mouth daily     Dispense:  84 tablet     Refill:  0    DISCONTD: Blood Glucose Monitoring Suppl (FREESTYLE FREEDOM LITE) w/Device KIT     Sig: Use device to check blood sugar twice daily     Dispense:  1 kit     Refill:  0    DISCONTD: FreeStyle Lancets MISC     Sig: Apply 1  lancet to finger to check blood sugar twice daily     Dispense:  100 each     Refill:  5    DISCONTD: blood glucose test strips (FREESTYLE LITE) strip     Sig: Use 1 test strip twice daily to check blood sugar level     Dispense:  100 each     Refill:  3    blood glucose test strips (FREESTYLE LITE) strip     Sig: Use 1 test strip to test blood sugars 3 times daily     Dispense:  100 each     Refill:  5    Blood Glucose Monitoring Suppl (FREESTYLE FREEDOM LITE) w/Device KIT     Sig: Use device to check blood sugar 3 times daily     Dispense:  1 kit     Refill:  0    FreeStyle Lancets MISC     Sig: Apply 1  lancet to finger to check blood sugar 3 times daily     Dispense:  100 each     Refill:  5

## 2025-02-07 ENCOUNTER — PATIENT MESSAGE (OUTPATIENT)
Facility: CLINIC | Age: 45
End: 2025-02-07

## 2025-02-07 DIAGNOSIS — E11.65 TYPE 2 DIABETES MELLITUS WITH HYPERGLYCEMIA, WITH LONG-TERM CURRENT USE OF INSULIN (HCC): Primary | ICD-10-CM

## 2025-02-07 DIAGNOSIS — Z79.4 TYPE 2 DIABETES MELLITUS WITH HYPERGLYCEMIA, WITH LONG-TERM CURRENT USE OF INSULIN (HCC): Primary | ICD-10-CM

## 2025-02-10 ENCOUNTER — TELEPHONE (OUTPATIENT)
Facility: CLINIC | Age: 45
End: 2025-02-10

## 2025-02-10 RX ORDER — ACYCLOVIR 400 MG/1
TABLET ORAL
Qty: 1 EACH | Refills: 0 | Status: SHIPPED | OUTPATIENT
Start: 2025-02-10

## 2025-02-10 RX ORDER — ACYCLOVIR 400 MG/1
TABLET ORAL
Qty: 3 EACH | Refills: 12 | Status: SHIPPED | OUTPATIENT
Start: 2025-02-10

## 2025-02-10 NOTE — TELEPHONE ENCOUNTER
Spoke with pt per fax from [facility] stating that Feestyle Freedm Lite kit is not covered.  Pt states she would like to get the dexacom.  [employer] message was sent by pt and forwarded to Dr. Rueda.

## 2025-03-11 DIAGNOSIS — I10 ESSENTIAL HYPERTENSION: ICD-10-CM

## 2025-03-11 DIAGNOSIS — Z76.0 MEDICATION REFILL: ICD-10-CM

## 2025-03-11 DIAGNOSIS — E11.9 TYPE 2 DIABETES MELLITUS WITHOUT COMPLICATION, WITHOUT LONG-TERM CURRENT USE OF INSULIN (HCC): ICD-10-CM

## 2025-03-11 DIAGNOSIS — E55.9 VITAMIN D DEFICIENCY: ICD-10-CM

## 2025-03-11 RX ORDER — TRIAMCINOLONE ACETONIDE 1 MG/G
OINTMENT TOPICAL
Qty: 60 G | Refills: 1 | Status: SHIPPED | OUTPATIENT
Start: 2025-03-11

## 2025-03-11 RX ORDER — LISINOPRIL AND HYDROCHLOROTHIAZIDE 10; 12.5 MG/1; MG/1
1 TABLET ORAL DAILY
Qty: 90 TABLET | Refills: 1 | Status: SHIPPED | OUTPATIENT
Start: 2025-03-11

## 2025-03-11 RX ORDER — INSULIN GLARGINE 100 [IU]/ML
INJECTION, SOLUTION SUBCUTANEOUS
Qty: 5 ADJUSTABLE DOSE PRE-FILLED PEN SYRINGE | Refills: 3 | Status: SHIPPED | OUTPATIENT
Start: 2025-03-11

## 2025-03-11 NOTE — TELEPHONE ENCOUNTER
Last Appointment:  1/24/2025  Future Appointments   Date Time Provider Department Center   3/21/2025 11:15 AM Honey Rueda MD [facility]   6/9/2025  1:15 PM [facility] 1 [facility]

## 2025-03-12 RX ORDER — CHOLECALCIFEROL (VITAMIN D3) 1250 MCG
CAPSULE ORAL
Qty: 12 CAPSULE | Refills: 1 | Status: SHIPPED | OUTPATIENT
Start: 2025-03-12

## 2025-03-12 NOTE — TELEPHONE ENCOUNTER
Orders Placed This Encounter    Cholecalciferol (VITAMIN D3) 1.25 MG (76429 UT) CAPS     Si tablet by mouth once weekly     Dispense:  12 capsule     Refill:  1

## 2025-03-13 DIAGNOSIS — E78.5 HYPERLIPIDEMIA, UNSPECIFIED HYPERLIPIDEMIA TYPE: ICD-10-CM

## 2025-03-13 DIAGNOSIS — E11.9 TYPE 2 DIABETES MELLITUS WITHOUT COMPLICATION, WITHOUT LONG-TERM CURRENT USE OF INSULIN (HCC): ICD-10-CM

## 2025-03-15 RX ORDER — ATORVASTATIN CALCIUM 10 MG/1
10 TABLET, FILM COATED ORAL DAILY
Qty: 90 TABLET | Refills: 1 | Status: SHIPPED | OUTPATIENT
Start: 2025-03-15

## 2025-03-15 NOTE — TELEPHONE ENCOUNTER
Orders Placed This Encounter    atorvastatin (LIPITOR) 10 MG tablet     Sig: Take 1 tablet by mouth daily     Dispense:  90 tablet     Refill:  1

## 2025-03-17 DIAGNOSIS — E11.9 TYPE 2 DIABETES MELLITUS WITHOUT COMPLICATION, WITHOUT LONG-TERM CURRENT USE OF INSULIN: ICD-10-CM

## 2025-03-20 NOTE — TELEPHONE ENCOUNTER
Orders Placed This Encounter    dulaglutide (TRULICITY) 1.5 MG/0.5ML SC injection     Sig: Inject 0.5 mLs into the skin every 7 days     Dispense:  2 mL     Refill:  2     Note: New dose.   D/c Trulicity 0.75 mg weekly

## 2025-04-25 ENCOUNTER — HOSPITAL ENCOUNTER (OUTPATIENT)
Facility: HOSPITAL | Age: 45
Discharge: HOME | End: 2025-04-28
Attending: FAMILY MEDICINE
Payer: MEDICAID

## 2025-04-25 DIAGNOSIS — R10.11 RUQ ABDOMINAL PAIN: ICD-10-CM

## 2025-04-25 PROCEDURE — 76705 ECHO EXAM OF ABDOMEN: CPT

## 2025-05-03 ENCOUNTER — RESULTS FOLLOW-UP (OUTPATIENT)
Facility: CLINIC | Age: 45
End: 2025-05-03

## 2025-05-05 ENCOUNTER — TELEPHONE (OUTPATIENT)
Facility: CLINIC | Age: 45
End: 2025-05-05

## 2025-05-05 NOTE — TELEPHONE ENCOUNTER
Spoke with patient in regardes to scheduling patient for imaging for abdominal pain. Patient stated that she has already had her appointment at [facility].

## 2025-05-30 ENCOUNTER — HOSPITAL ENCOUNTER (OUTPATIENT)
Facility: HOSPITAL | Age: 45
Setting detail: SPECIMEN
Discharge: HOME | End: 2025-06-02

## 2025-05-30 ENCOUNTER — OFFICE VISIT (OUTPATIENT)
Facility: CLINIC | Age: 45
End: 2025-05-30
Payer: MEDICAID

## 2025-05-30 VITALS
HEIGHT: 71 IN | DIASTOLIC BLOOD PRESSURE: 70 MMHG | WEIGHT: 243 LBS | OXYGEN SATURATION: 98 % | TEMPERATURE: 97.1 F | RESPIRATION RATE: 17 BRPM | HEART RATE: 93 BPM | BODY MASS INDEX: 34.02 KG/M2 | SYSTOLIC BLOOD PRESSURE: 107 MMHG

## 2025-05-30 DIAGNOSIS — E66.09 CLASS 1 OBESITY DUE TO EXCESS CALORIES WITH SERIOUS COMORBIDITY AND BODY MASS INDEX (BMI) OF 33.0 TO 33.9 IN ADULT: ICD-10-CM

## 2025-05-30 DIAGNOSIS — E66.811 CLASS 1 OBESITY DUE TO EXCESS CALORIES WITH SERIOUS COMORBIDITY AND BODY MASS INDEX (BMI) OF 33.0 TO 33.9 IN ADULT: ICD-10-CM

## 2025-05-30 DIAGNOSIS — E11.9 TYPE 2 DIABETES MELLITUS WITHOUT COMPLICATION, WITHOUT LONG-TERM CURRENT USE OF INSULIN (HCC): Primary | ICD-10-CM

## 2025-05-30 DIAGNOSIS — R42 LIGHTHEADEDNESS: ICD-10-CM

## 2025-05-30 DIAGNOSIS — E55.9 VITAMIN D DEFICIENCY: ICD-10-CM

## 2025-05-30 DIAGNOSIS — I10 ESSENTIAL HYPERTENSION: ICD-10-CM

## 2025-05-30 PROCEDURE — 3046F HEMOGLOBIN A1C LEVEL >9.0%: CPT | Performed by: FAMILY MEDICINE

## 2025-05-30 PROCEDURE — 3074F SYST BP LT 130 MM HG: CPT | Performed by: FAMILY MEDICINE

## 2025-05-30 PROCEDURE — 99001 SPECIMEN HANDLING PT-LAB: CPT

## 2025-05-30 PROCEDURE — 99214 OFFICE O/P EST MOD 30 MIN: CPT | Performed by: FAMILY MEDICINE

## 2025-05-30 PROCEDURE — 3078F DIAST BP <80 MM HG: CPT | Performed by: FAMILY MEDICINE

## 2025-05-30 RX ORDER — LISINOPRIL AND HYDROCHLOROTHIAZIDE 10; 12.5 MG/1; MG/1
1 TABLET ORAL DAILY
Qty: 90 TABLET | Refills: 1 | Status: SHIPPED | OUTPATIENT
Start: 2025-05-30

## 2025-05-30 RX ORDER — INSULIN GLARGINE 100 [IU]/ML
INJECTION, SOLUTION SUBCUTANEOUS
Qty: 5 ADJUSTABLE DOSE PRE-FILLED PEN SYRINGE | Refills: 3 | Status: SHIPPED | OUTPATIENT
Start: 2025-05-30

## 2025-05-30 RX ORDER — CHOLECALCIFEROL (VITAMIN D3) 1250 MCG
CAPSULE ORAL
Qty: 12 CAPSULE | Refills: 1 | Status: SHIPPED | OUTPATIENT
Start: 2025-05-30

## 2025-05-30 SDOH — ECONOMIC STABILITY: FOOD INSECURITY: WITHIN THE PAST 12 MONTHS, YOU WORRIED THAT YOUR FOOD WOULD RUN OUT BEFORE YOU GOT MONEY TO BUY MORE.: NEVER TRUE

## 2025-05-30 SDOH — ECONOMIC STABILITY: FOOD INSECURITY: WITHIN THE PAST 12 MONTHS, THE FOOD YOU BOUGHT JUST DIDN'T LAST AND YOU DIDN'T HAVE MONEY TO GET MORE.: NEVER TRUE

## 2025-05-30 ASSESSMENT — PATIENT HEALTH QUESTIONNAIRE - PHQ9
SUM OF ALL RESPONSES TO PHQ QUESTIONS 1-9: 1
SUM OF ALL RESPONSES TO PHQ QUESTIONS 1-9: 1
1. LITTLE INTEREST OR PLEASURE IN DOING THINGS: NOT AT ALL
SUM OF ALL RESPONSES TO PHQ QUESTIONS 1-9: 1
SUM OF ALL RESPONSES TO PHQ QUESTIONS 1-9: 1
2. FEELING DOWN, DEPRESSED OR HOPELESS: SEVERAL DAYS

## 2025-05-31 ENCOUNTER — PATIENT MESSAGE (OUTPATIENT)
Facility: CLINIC | Age: 45
End: 2025-05-31

## 2025-05-31 DIAGNOSIS — E11.65 TYPE 2 DIABETES MELLITUS WITH HYPERGLYCEMIA, WITH LONG-TERM CURRENT USE OF INSULIN (HCC): Primary | ICD-10-CM

## 2025-05-31 DIAGNOSIS — Z79.4 TYPE 2 DIABETES MELLITUS WITH HYPERGLYCEMIA, WITH LONG-TERM CURRENT USE OF INSULIN (HCC): Primary | ICD-10-CM

## 2025-05-31 LAB
A/G RATIO: 1.8 RATIO (ref 1.1–2.6)
ALBUMIN: 4.8 G/DL (ref 3.5–5)
ALP BLD-CCNC: 54 U/L (ref 25–115)
ALT SERPL-CCNC: 20 U/L (ref 5–40)
ANION GAP SERPL CALCULATED.3IONS-SCNC: 15 MMOL/L (ref 3–15)
AST SERPL-CCNC: 17 U/L (ref 10–37)
BASOPHILS # BLD: 1 % (ref 0–2)
BASOPHILS ABSOLUTE: 0.1 K/UL (ref 0–0.2)
BILIRUB SERPL-MCNC: 0.8 MG/DL (ref 0.2–1.2)
BUN BLDV-MCNC: 8 MG/DL (ref 6–22)
CALCIUM SERPL-MCNC: 10 MG/DL (ref 8.4–10.5)
CHLORIDE BLD-SCNC: 98 MMOL/L (ref 98–110)
CO2: 26 MMOL/L (ref 20–32)
CREAT SERPL-MCNC: 0.7 MG/DL (ref 0.5–1.2)
EOSINOPHIL # BLD: 1 % (ref 0–6)
EOSINOPHILS ABSOLUTE: 0.1 K/UL (ref 0–0.5)
FERRITIN: 108 NG/ML (ref 10–291)
GFR, ESTIMATED: >90 ML/MIN/1.73 SQ.M.
GLOBULIN: 2.7 G/DL (ref 2–4)
GLUCOSE: 191 MG/DL (ref 70–99)
HCT VFR BLD CALC: 40.6 % (ref 35.1–48)
HEMOGLOBIN: 12.6 G/DL (ref 11.7–16)
IRON % SATURATION: 14 % (ref 20–50)
IRON: 42 MCG/DL (ref 30–160)
LYMPHOCYTES # BLD: 31 % (ref 20–45)
LYMPHOCYTES ABSOLUTE: 2.4 K/UL (ref 1–4.8)
MCH RBC QN AUTO: 28 PG (ref 26–34)
MCHC RBC AUTO-ENTMCNC: 31 G/DL (ref 31–36)
MCV RBC AUTO: 90 FL (ref 80–99)
MONOCYTES ABSOLUTE: 0.5 K/UL (ref 0.1–1)
MONOCYTES: 6 % (ref 3–12)
NEUTROPHILS ABSOLUTE: 4.7 K/UL (ref 1.8–7.7)
NEUTROPHILS SEGMENTED: 61 % (ref 40–75)
PDW BLD-RTO: 13.8 % (ref 10–15.5)
PLATELET # BLD: 349 K/UL (ref 140–440)
PMV BLD AUTO: 10.5 FL (ref 9–13)
POTASSIUM SERPL-SCNC: 3.8 MMOL/L (ref 3.5–5.5)
RBC # BLD: 4.5 M/UL (ref 3.8–5.2)
SODIUM BLD-SCNC: 139 MMOL/L (ref 133–145)
T4 FREE: 1.4 NG/DL (ref 0.9–1.8)
TOTAL IRON BINDING CAPACITY: 311 MCG/DL (ref 228–428)
TOTAL PROTEIN: 7.5 G/DL (ref 6.4–8.3)
TSH SERPL DL<=0.05 MIU/L-ACNC: 0.87 MCU/ML (ref 0.27–4.2)
UNSATURATED IRON BINDING CAPACITY: 269 MCG/DL (ref 110–370)
WBC # BLD: 7.8 K/UL (ref 4–11)

## 2025-06-01 LAB — SENTARA SPECIMEN COLLECTION: NORMAL

## 2025-06-02 NOTE — TELEPHONE ENCOUNTER
Orders Placed This Encounter    Dulaglutide 3 MG/0.5ML SOAJ     Sig: Inject 3 mg into the skin every 7 days     Dispense:  2 mL     Refill:  0    Dulaglutide 4.5 MG/0.5ML SOAJ     Sig: Inject 0.5 mLs into the skin once a week After complete the 3.0 mg dose of Trulicity.     Dispense:  2 mL     Refill:  3

## 2025-06-03 ENCOUNTER — RESULTS FOLLOW-UP (OUTPATIENT)
Facility: CLINIC | Age: 45
End: 2025-06-03

## 2025-06-03 PROBLEM — E55.9 VITAMIN D DEFICIENCY: Status: ACTIVE | Noted: 2025-06-03

## 2025-06-03 ASSESSMENT — ENCOUNTER SYMPTOMS
ABDOMINAL PAIN: 0
SHORTNESS OF BREATH: 1

## 2025-06-03 NOTE — ASSESSMENT & PLAN NOTE
-Well-controlled, continue current medications, continue current treatment plan, medication adherence emphasized, and lifestyle modifications recommended  -Taking amlodipine 2.5 mg once daily, lisinopril hydrochlorothiazide 10-12.5 mg once daily    Orders:    lisinopril-hydroCHLOROthiazide (PRINZIDE;ZESTORETIC) 10-12.5 MG per tablet; Take 1 tablet by mouth daily    Comprehensive Metabolic Panel; Future

## 2025-06-03 NOTE — ASSESSMENT & PLAN NOTE
Uncontrolled.  Patient's not taking medication as directed.  Patient is taking her medication as patient sees fit.  Last A1c  10.3. in January 2025  Patient is not taking glipizide 10 mg extended release daily.  Taking Trulicity 1.5 mg once daily.  Will consider increasing  Trulicity  after get patient taking Lantus 25 units once daily on a regular basis.  - Patient encouraged frequent blood pressure checks to look out for signs and symptoms of hypoglycemia.    Orders:    insulin glargine (LANTUS SOLOSTAR) 100 UNIT/ML injection pen; Inject 25 units subcutaneously once a day.    CBC with Auto Differential; Future    TSH + Free T4 Panel; Future    Comprehensive Metabolic Panel; Future

## 2025-06-03 NOTE — PROGRESS NOTES
Danielle Barraza (:  1980) is a 45 y.o. female,Established patient, here for evaluation of the following chief complaint(s):  Thyroid Problem         Assessment & Plan  Type 2 diabetes mellitus without complication, without long-term current use of insulin (HCC)  Uncontrolled.  Patient's not taking medication as directed.  Patient is taking her medication as patient sees fit.  Last A1c  10.3. in 2025  Patient is not taking glipizide 10 mg extended release daily.  Taking Trulicity 1.5 mg once daily.  Will consider increasing  Trulicity  after get patient taking Lantus 25 units once daily on a regular basis.  - Patient encouraged frequent blood pressure checks to look out for signs and symptoms of hypoglycemia.    Orders:    insulin glargine (LANTUS SOLOSTAR) 100 UNIT/ML injection pen; Inject 25 units subcutaneously once a day.    CBC with Auto Differential; Future    TSH + Free T4 Panel; Future    Comprehensive Metabolic Panel; Future    Vitamin D deficiency   Orders:    Cholecalciferol (VITAMIN D3) 1.25 MG (91156 UT) CAPS; 1 tablet by mouth once weekly    Essential hypertension  -Well-controlled, continue current medications, continue current treatment plan, medication adherence emphasized, and lifestyle modifications recommended  -Taking amlodipine 2.5 mg once daily, lisinopril hydrochlorothiazide 10-12.5 mg once daily    Orders:    lisinopril-hydroCHLOROthiazide (PRINZIDE;ZESTORETIC) 10-12.5 MG per tablet; Take 1 tablet by mouth daily    Comprehensive Metabolic Panel; Future    Lightheadedness   Check orthostatics:  which  was positive         BP  HR  Lying ----------  107/70            90  Sitting----------  132/86      93  Standing-------  121/75           101      Orders:    CBC with Auto Differential; Future    TSH + Free T4 Panel; Future    Iron and TIBC; Future    Ferritin; Future    Comprehensive Metabolic Panel; Future    T4, Free    Class 1 obesity due to excess calories with serious

## 2025-06-30 DIAGNOSIS — E11.65 TYPE 2 DIABETES MELLITUS WITH HYPERGLYCEMIA, WITH LONG-TERM CURRENT USE OF INSULIN (HCC): ICD-10-CM

## 2025-06-30 DIAGNOSIS — Z79.4 TYPE 2 DIABETES MELLITUS WITH HYPERGLYCEMIA, WITH LONG-TERM CURRENT USE OF INSULIN (HCC): ICD-10-CM

## 2025-07-03 RX ORDER — MAGNESIUM OXIDE 400 MG/1
400 TABLET ORAL DAILY
Qty: 30 TABLET | Refills: 5 | Status: SHIPPED | OUTPATIENT
Start: 2025-07-03

## 2025-07-04 NOTE — TELEPHONE ENCOUNTER
Orders Placed This Encounter    magnesium oxide (MAG-OX) 400 MG tablet     Sig: Take 1 tablet by mouth daily PRN, patient nauseous     Dispense:  30 tablet     Refill:  5     Sent because mag citrate was not available per pharmacy.        Note Trulicity 3 mg weekly dose was not sent to the pharmacy.  Patient completed the 1 month dose of the 3 mg that was sent to the pharmacy.     The next dose  of Trulicity is 4.5 mg once weekly.  This dose was sent to the pharmacy on 6/2/2025 along with 3 refills.  Left message on patient's voicemail  explaining the above.  The patient was instructed to contact the office if she wants to continue at the 3 mg weekly dose and not advance to the 4.5 mg weekly dose so that a new prescription order can be created.

## 2025-07-06 DIAGNOSIS — D50.9 IRON DEFICIENCY ANEMIA, UNSPECIFIED IRON DEFICIENCY ANEMIA TYPE: ICD-10-CM

## 2025-07-07 RX ORDER — FERROUS SULFATE 325(65) MG
1 TABLET ORAL 2 TIMES DAILY
Qty: 180 TABLET | Refills: 0 | Status: SHIPPED | OUTPATIENT
Start: 2025-07-07 | End: 2025-07-08

## 2025-07-08 DIAGNOSIS — D50.9 IRON DEFICIENCY ANEMIA, UNSPECIFIED IRON DEFICIENCY ANEMIA TYPE: ICD-10-CM

## 2025-07-08 RX ORDER — FERROUS SULFATE 325(65) MG
1 TABLET ORAL 2 TIMES DAILY
Qty: 180 TABLET | Refills: 0 | Status: SHIPPED | OUTPATIENT
Start: 2025-07-08

## 2025-07-08 NOTE — TELEPHONE ENCOUNTER
Orders Placed This Encounter    FEROSUL 325 (65 Fe) MG tablet     Sig: Take 1 tablet by mouth twice daily     Dispense:  180 tablet     Refill:  0

## 2025-07-10 DIAGNOSIS — E11.9 TYPE 2 DIABETES MELLITUS WITHOUT COMPLICATION, WITHOUT LONG-TERM CURRENT USE OF INSULIN (HCC): ICD-10-CM

## 2025-07-10 DIAGNOSIS — E78.5 HYPERLIPIDEMIA, UNSPECIFIED HYPERLIPIDEMIA TYPE: ICD-10-CM

## 2025-07-10 RX ORDER — ATORVASTATIN CALCIUM 10 MG/1
10 TABLET, FILM COATED ORAL DAILY
Qty: 90 TABLET | Refills: 1 | Status: SHIPPED | OUTPATIENT
Start: 2025-07-10

## 2025-08-12 DIAGNOSIS — Z76.0 MEDICATION REFILL: ICD-10-CM

## 2025-08-13 RX ORDER — TRIAMCINOLONE ACETONIDE 1 MG/G
OINTMENT TOPICAL
Qty: 60 G | Refills: 1 | Status: SHIPPED | OUTPATIENT
Start: 2025-08-13